# Patient Record
Sex: FEMALE | Race: WHITE | Employment: OTHER | URBAN - METROPOLITAN AREA
[De-identification: names, ages, dates, MRNs, and addresses within clinical notes are randomized per-mention and may not be internally consistent; named-entity substitution may affect disease eponyms.]

---

## 2017-01-30 RX ORDER — METOPROLOL TARTRATE 100 MG/1
TABLET ORAL
Qty: 30 TABLET | Refills: 0 | Status: SHIPPED | OUTPATIENT
Start: 2017-01-30 | End: 2017-03-02 | Stop reason: SDUPTHER

## 2017-03-03 RX ORDER — METOPROLOL TARTRATE 100 MG/1
TABLET ORAL
Qty: 30 TABLET | Refills: 0 | Status: SHIPPED | OUTPATIENT
Start: 2017-03-03 | End: 2017-04-02 | Stop reason: SDUPTHER

## 2017-04-03 RX ORDER — AMLODIPINE BESYLATE 10 MG/1
TABLET ORAL
Qty: 30 TABLET | Refills: 3 | Status: SHIPPED | OUTPATIENT
Start: 2017-04-03 | End: 2017-08-08 | Stop reason: SDUPTHER

## 2017-04-03 RX ORDER — METOPROLOL TARTRATE 100 MG/1
TABLET ORAL
Qty: 30 TABLET | Refills: 0 | Status: SHIPPED | OUTPATIENT
Start: 2017-04-03 | End: 2017-05-05 | Stop reason: SDUPTHER

## 2017-04-04 ENCOUNTER — OFFICE VISIT (OUTPATIENT)
Dept: FAMILY MEDICINE CLINIC | Age: 82
End: 2017-04-04

## 2017-04-04 VITALS
OXYGEN SATURATION: 98 % | HEART RATE: 63 BPM | BODY MASS INDEX: 30.65 KG/M2 | DIASTOLIC BLOOD PRESSURE: 66 MMHG | WEIGHT: 173 LBS | HEIGHT: 63 IN | SYSTOLIC BLOOD PRESSURE: 132 MMHG

## 2017-04-04 DIAGNOSIS — L30.9 DERMATITIS: Primary | ICD-10-CM

## 2017-04-04 PROCEDURE — 99213 OFFICE O/P EST LOW 20 MIN: CPT | Performed by: NURSE PRACTITIONER

## 2017-04-04 ASSESSMENT — ENCOUNTER SYMPTOMS
DIARRHEA: 0
VOMITING: 0
SHORTNESS OF BREATH: 0
EYE PAIN: 0
COUGH: 0
NAIL CHANGES: 0
SORE THROAT: 0
RHINORRHEA: 0

## 2017-04-18 RX ORDER — ALENDRONATE SODIUM 70 MG/1
TABLET ORAL
Qty: 4 TABLET | Refills: 2 | Status: SHIPPED | OUTPATIENT
Start: 2017-04-18 | End: 2017-07-20 | Stop reason: SDUPTHER

## 2017-05-08 RX ORDER — METOPROLOL TARTRATE 100 MG/1
TABLET ORAL
Qty: 30 TABLET | Refills: 0 | Status: SHIPPED | OUTPATIENT
Start: 2017-05-08 | End: 2017-06-07 | Stop reason: SDUPTHER

## 2017-06-08 RX ORDER — METOPROLOL TARTRATE 100 MG/1
TABLET ORAL
Qty: 30 TABLET | Refills: 0 | Status: SHIPPED | OUTPATIENT
Start: 2017-06-08 | End: 2017-07-07 | Stop reason: SDUPTHER

## 2017-07-10 RX ORDER — METOPROLOL TARTRATE 100 MG/1
TABLET ORAL
Qty: 30 TABLET | Refills: 0 | Status: SHIPPED | OUTPATIENT
Start: 2017-07-10 | End: 2017-08-08 | Stop reason: SDUPTHER

## 2017-07-21 RX ORDER — ALENDRONATE SODIUM 70 MG/1
TABLET ORAL
Qty: 4 TABLET | Refills: 1 | Status: SHIPPED | OUTPATIENT
Start: 2017-07-21 | End: 2017-11-21

## 2017-08-09 RX ORDER — METOPROLOL TARTRATE 100 MG/1
TABLET ORAL
Qty: 30 TABLET | Refills: 0 | Status: SHIPPED | OUTPATIENT
Start: 2017-08-09 | End: 2017-09-10 | Stop reason: SDUPTHER

## 2017-08-09 RX ORDER — AMLODIPINE BESYLATE 10 MG/1
TABLET ORAL
Qty: 30 TABLET | Refills: 2 | Status: SHIPPED | OUTPATIENT
Start: 2017-08-09 | End: 2017-11-16 | Stop reason: SDUPTHER

## 2017-09-11 RX ORDER — METOPROLOL TARTRATE 100 MG/1
TABLET ORAL
Qty: 30 TABLET | Refills: 0 | Status: SHIPPED | OUTPATIENT
Start: 2017-09-11 | End: 2017-10-12 | Stop reason: SDUPTHER

## 2017-09-14 ENCOUNTER — HOSPITAL ENCOUNTER (OUTPATIENT)
Dept: MAMMOGRAPHY | Age: 82
Discharge: HOME | End: 2017-09-14
Admitting: FAMILY MEDICINE

## 2017-09-14 DIAGNOSIS — Z12.31 ENCOUNTER FOR SCREENING MAMMOGRAM FOR BREAST CANCER: ICD-10-CM

## 2017-10-13 RX ORDER — METOPROLOL TARTRATE 100 MG/1
TABLET ORAL
Qty: 30 TABLET | Refills: 0 | Status: SHIPPED | OUTPATIENT
Start: 2017-10-13 | End: 2017-11-16 | Stop reason: SDUPTHER

## 2017-10-13 NOTE — TELEPHONE ENCOUNTER
Future Appointments  Date Time Provider Alice Randolph   10/19/2017 11:00 AM Tamara Cruz CNP [facility]   Last ov 4/4/17

## 2017-11-16 NOTE — TELEPHONE ENCOUNTER
Last appt - 04/04/17    Future Appointments  Date Time Provider Alice Randolph   11/21/2017 3:00 PM Lorin Expose, CNP [facility]

## 2017-11-17 RX ORDER — AMLODIPINE BESYLATE 10 MG/1
TABLET ORAL
Qty: 30 TABLET | Refills: 1 | Status: SHIPPED | OUTPATIENT
Start: 2017-11-17 | End: 2018-01-19 | Stop reason: SDUPTHER

## 2017-11-17 RX ORDER — METOPROLOL TARTRATE 100 MG/1
TABLET ORAL
Qty: 30 TABLET | Refills: 0 | Status: SHIPPED | OUTPATIENT
Start: 2017-11-17 | End: 2017-12-15 | Stop reason: SDUPTHER

## 2017-11-21 ENCOUNTER — OFFICE VISIT (OUTPATIENT)
Dept: FAMILY MEDICINE CLINIC | Age: 82
End: 2017-11-21

## 2017-11-21 VITALS
HEART RATE: 83 BPM | OXYGEN SATURATION: 97 % | WEIGHT: 167 LBS | SYSTOLIC BLOOD PRESSURE: 136 MMHG | DIASTOLIC BLOOD PRESSURE: 72 MMHG | BODY MASS INDEX: 29.59 KG/M2 | HEIGHT: 63 IN

## 2017-11-21 DIAGNOSIS — I10 ESSENTIAL HYPERTENSION: ICD-10-CM

## 2017-11-21 DIAGNOSIS — Z23 IMMUNIZATION DUE: ICD-10-CM

## 2017-11-21 DIAGNOSIS — B35.9 TINEA: ICD-10-CM

## 2017-11-21 DIAGNOSIS — R21 RASH: Primary | ICD-10-CM

## 2017-11-21 PROCEDURE — 90670 PCV13 VACCINE IM: CPT | Performed by: NURSE PRACTITIONER

## 2017-11-21 PROCEDURE — 99213 OFFICE O/P EST LOW 20 MIN: CPT | Performed by: NURSE PRACTITIONER

## 2017-11-21 PROCEDURE — G0009 ADMIN PNEUMOCOCCAL VACCINE: HCPCS | Performed by: NURSE PRACTITIONER

## 2017-11-21 RX ORDER — CLOTRIMAZOLE AND BETAMETHASONE DIPROPIONATE 10; .64 MG/G; MG/G
CREAM TOPICAL
Qty: 15 G | Refills: 0 | Status: SHIPPED | OUTPATIENT
Start: 2017-11-21 | End: 2021-06-16 | Stop reason: ALTCHOICE

## 2017-11-21 ASSESSMENT — ENCOUNTER SYMPTOMS
COUGH: 0
SORE THROAT: 0

## 2017-12-18 RX ORDER — METOPROLOL TARTRATE 100 MG/1
TABLET ORAL
Qty: 30 TABLET | Refills: 0 | Status: SHIPPED | OUTPATIENT
Start: 2017-12-18 | End: 2018-01-19 | Stop reason: SDUPTHER

## 2018-01-22 RX ORDER — AMLODIPINE BESYLATE 10 MG/1
TABLET ORAL
Qty: 30 TABLET | Refills: 0 | Status: SHIPPED | OUTPATIENT
Start: 2018-01-22 | End: 2018-02-18 | Stop reason: SDUPTHER

## 2018-01-22 RX ORDER — METOPROLOL TARTRATE 100 MG/1
TABLET ORAL
Qty: 30 TABLET | Refills: 0 | Status: SHIPPED | OUTPATIENT
Start: 2018-01-22 | End: 2018-02-18 | Stop reason: SDUPTHER

## 2018-02-19 RX ORDER — METOPROLOL TARTRATE 100 MG/1
TABLET ORAL
Qty: 30 TABLET | Refills: 0 | Status: SHIPPED | OUTPATIENT
Start: 2018-02-19 | End: 2018-03-22 | Stop reason: SDUPTHER

## 2018-02-19 RX ORDER — AMLODIPINE BESYLATE 10 MG/1
TABLET ORAL
Qty: 30 TABLET | Refills: 0 | Status: SHIPPED | OUTPATIENT
Start: 2018-02-19 | End: 2018-03-22 | Stop reason: SDUPTHER

## 2018-03-23 RX ORDER — AMLODIPINE BESYLATE 10 MG/1
TABLET ORAL
Qty: 30 TABLET | Refills: 0 | Status: SHIPPED | OUTPATIENT
Start: 2018-03-23 | End: 2018-04-22 | Stop reason: SDUPTHER

## 2018-03-23 RX ORDER — METOPROLOL TARTRATE 100 MG/1
TABLET ORAL
Qty: 30 TABLET | Refills: 0 | Status: SHIPPED | OUTPATIENT
Start: 2018-03-23 | End: 2018-04-22 | Stop reason: SDUPTHER

## 2018-04-23 RX ORDER — AMLODIPINE BESYLATE 10 MG/1
TABLET ORAL
Qty: 30 TABLET | Refills: 2 | Status: SHIPPED | OUTPATIENT
Start: 2018-04-23 | End: 2018-07-24 | Stop reason: SDUPTHER

## 2018-04-23 RX ORDER — METOPROLOL TARTRATE 100 MG/1
TABLET ORAL
Qty: 30 TABLET | Refills: 2 | Status: SHIPPED | OUTPATIENT
Start: 2018-04-23 | End: 2018-07-24 | Stop reason: SDUPTHER

## 2018-07-25 RX ORDER — METOPROLOL TARTRATE 100 MG/1
TABLET ORAL
Qty: 30 TABLET | Refills: 1 | Status: SHIPPED | OUTPATIENT
Start: 2018-07-25 | End: 2018-09-26 | Stop reason: SDUPTHER

## 2018-07-25 RX ORDER — AMLODIPINE BESYLATE 10 MG/1
TABLET ORAL
Qty: 30 TABLET | Refills: 1 | Status: SHIPPED | OUTPATIENT
Start: 2018-07-25 | End: 2018-09-26 | Stop reason: SDUPTHER

## 2018-08-01 ENCOUNTER — OFFICE VISIT (OUTPATIENT)
Dept: FAMILY MEDICINE CLINIC | Age: 83
End: 2018-08-01

## 2018-08-01 VITALS
BODY MASS INDEX: 28.88 KG/M2 | SYSTOLIC BLOOD PRESSURE: 146 MMHG | OXYGEN SATURATION: 98 % | HEART RATE: 72 BPM | HEIGHT: 63 IN | DIASTOLIC BLOOD PRESSURE: 72 MMHG | WEIGHT: 163 LBS

## 2018-08-01 DIAGNOSIS — R06.01 ORTHOPNEA: ICD-10-CM

## 2018-08-01 DIAGNOSIS — M79.89 LEG SWELLING: Primary | ICD-10-CM

## 2018-08-01 DIAGNOSIS — M79.89 LEG SWELLING: ICD-10-CM

## 2018-08-01 DIAGNOSIS — D17.20 LIPOMA OF LOWER EXTREMITY, UNSPECIFIED LATERALITY: ICD-10-CM

## 2018-08-01 LAB
A/G RATIO: 1.3 (ref 1.1–2.2)
ALBUMIN SERPL-MCNC: 4 G/DL (ref 3.4–5)
ALP BLD-CCNC: 90 U/L (ref 40–129)
ALT SERPL-CCNC: 10 U/L (ref 10–40)
ANION GAP SERPL CALCULATED.3IONS-SCNC: 11 MMOL/L (ref 3–16)
AST SERPL-CCNC: 15 U/L (ref 15–37)
BILIRUB SERPL-MCNC: 0.6 MG/DL (ref 0–1)
BUN BLDV-MCNC: 23 MG/DL (ref 7–20)
CALCIUM SERPL-MCNC: 9.5 MG/DL (ref 8.3–10.6)
CHLORIDE BLD-SCNC: 105 MMOL/L (ref 99–110)
CO2: 26 MMOL/L (ref 21–32)
CREAT SERPL-MCNC: 1.1 MG/DL (ref 0.6–1.2)
GFR AFRICAN AMERICAN: 57
GFR NON-AFRICAN AMERICAN: 48
GLOBULIN: 3.1 G/DL
GLUCOSE BLD-MCNC: 83 MG/DL (ref 70–99)
HCT VFR BLD CALC: 30.7 % (ref 36–48)
HEMOGLOBIN: 10.3 G/DL (ref 12–16)
MCH RBC QN AUTO: 33.4 PG (ref 26–34)
MCHC RBC AUTO-ENTMCNC: 33.6 G/DL (ref 31–36)
MCV RBC AUTO: 99.4 FL (ref 80–100)
PDW BLD-RTO: 15.5 % (ref 12.4–15.4)
PLATELET # BLD: 220 K/UL (ref 135–450)
PMV BLD AUTO: 9 FL (ref 5–10.5)
POTASSIUM SERPL-SCNC: 4.4 MMOL/L (ref 3.5–5.1)
PRO-BNP: 384 PG/ML (ref 0–449)
RBC # BLD: 3.09 M/UL (ref 4–5.2)
SODIUM BLD-SCNC: 142 MMOL/L (ref 136–145)
TOTAL PROTEIN: 7.1 G/DL (ref 6.4–8.2)
WBC # BLD: 4.6 K/UL (ref 4–11)

## 2018-08-01 PROCEDURE — 99213 OFFICE O/P EST LOW 20 MIN: CPT | Performed by: NURSE PRACTITIONER

## 2018-08-01 ASSESSMENT — PATIENT HEALTH QUESTIONNAIRE - PHQ9
SUM OF ALL RESPONSES TO PHQ9 QUESTIONS 1 & 2: 0
2. FEELING DOWN, DEPRESSED OR HOPELESS: 0
1. LITTLE INTEREST OR PLEASURE IN DOING THINGS: 0
SUM OF ALL RESPONSES TO PHQ QUESTIONS 1-9: 0

## 2018-08-01 ASSESSMENT — ENCOUNTER SYMPTOMS
STRIDOR: 0
SHORTNESS OF BREATH: 0
WHEEZING: 0
COUGH: 0
COLOR CHANGE: 0

## 2018-08-02 DIAGNOSIS — M79.89 LEG SWELLING: Primary | ICD-10-CM

## 2018-08-02 RX ORDER — HYDROCHLOROTHIAZIDE 25 MG/1
25 TABLET ORAL DAILY
Qty: 7 TABLET | Refills: 0 | Status: SHIPPED | OUTPATIENT
Start: 2018-08-02 | End: 2018-08-15 | Stop reason: SDUPTHER

## 2018-08-08 ENCOUNTER — TELEPHONE (OUTPATIENT)
Dept: FAMILY MEDICINE CLINIC | Age: 83
End: 2018-08-08

## 2018-08-15 NOTE — TELEPHONE ENCOUNTER
Patients daughter called back because she hadnt heard from our office. She wants to know if the patient is suppose to stay on the hctz and if you were going to change her BP medications.

## 2018-08-16 RX ORDER — HYDROCHLOROTHIAZIDE 25 MG/1
25 TABLET ORAL DAILY
Qty: 30 TABLET | Refills: 0 | Status: SHIPPED | OUTPATIENT
Start: 2018-08-16 | End: 2018-09-26 | Stop reason: SDUPTHER

## 2018-09-05 ENCOUNTER — OFFICE VISIT (OUTPATIENT)
Dept: FAMILY MEDICINE CLINIC | Age: 83
End: 2018-09-05

## 2018-09-05 VITALS
BODY MASS INDEX: 28.53 KG/M2 | HEART RATE: 63 BPM | DIASTOLIC BLOOD PRESSURE: 62 MMHG | SYSTOLIC BLOOD PRESSURE: 138 MMHG | OXYGEN SATURATION: 97 % | HEIGHT: 63 IN | WEIGHT: 161 LBS

## 2018-09-05 DIAGNOSIS — I10 ESSENTIAL HYPERTENSION: ICD-10-CM

## 2018-09-05 DIAGNOSIS — M79.89 LEG SWELLING: Primary | ICD-10-CM

## 2018-09-05 DIAGNOSIS — M79.89 LEG SWELLING: ICD-10-CM

## 2018-09-05 DIAGNOSIS — Z23 IMMUNIZATION DUE: ICD-10-CM

## 2018-09-05 LAB
ANION GAP SERPL CALCULATED.3IONS-SCNC: 12 MMOL/L (ref 3–16)
BUN BLDV-MCNC: 25 MG/DL (ref 7–20)
CALCIUM SERPL-MCNC: 9.4 MG/DL (ref 8.3–10.6)
CHLORIDE BLD-SCNC: 100 MMOL/L (ref 99–110)
CO2: 28 MMOL/L (ref 21–32)
CREAT SERPL-MCNC: 1.1 MG/DL (ref 0.6–1.2)
GFR AFRICAN AMERICAN: 57
GFR NON-AFRICAN AMERICAN: 47
GLUCOSE BLD-MCNC: 81 MG/DL (ref 70–99)
POTASSIUM SERPL-SCNC: 4.3 MMOL/L (ref 3.5–5.1)
SODIUM BLD-SCNC: 140 MMOL/L (ref 136–145)

## 2018-09-05 PROCEDURE — G0008 ADMIN INFLUENZA VIRUS VAC: HCPCS | Performed by: NURSE PRACTITIONER

## 2018-09-05 PROCEDURE — 90662 IIV NO PRSV INCREASED AG IM: CPT | Performed by: NURSE PRACTITIONER

## 2018-09-05 PROCEDURE — 99213 OFFICE O/P EST LOW 20 MIN: CPT | Performed by: NURSE PRACTITIONER

## 2018-09-05 ASSESSMENT — ENCOUNTER SYMPTOMS
STRIDOR: 0
WHEEZING: 0
COUGH: 0
COLOR CHANGE: 0
SHORTNESS OF BREATH: 0

## 2018-09-05 NOTE — PROGRESS NOTES
HPI: Lisa Brown is a 81 y.o. female who presents for hypertension and leg swelling. Leg swelling has improved with starting HCTZ daily. She is able to get her feet into her shoes. Her blood pressure is well controlled today. She tells me she has been checking her BP at home daily and those readings have been \"good. \" she forgot to bring her BP log with her today. States she has had some mild dizziness some mornings after taking HCTZ. Dr. Ye Fernandez for anemia. 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 No pertinent family history. Review of Systems   Constitutional: Negative for diaphoresis, fatigue, fever and unexpected weight change. Respiratory: Negative for cough, shortness of breath, wheezing and stridor. Cardiovascular: Positive for leg swelling. Negative for chest pain and palpitations. Skin: Negative for color change, pallor, rash and wound. Neurological: Negative for dizziness and headaches. Physical Exam   Constitutional: She is oriented to person, place, and time. Vital signs are normal. She appears well-developed and well-nourished. No distress. HENT:   Head: Normocephalic and atraumatic. Right Ear: Tympanic membrane and ear canal normal.   Left Ear: Tympanic membrane and ear canal normal.   Mouth/Throat: Uvula is midline and mucous membranes are normal.   Neck: Full passive range of motion without pain. Cardiovascular: Normal rate, regular rhythm, S1 normal, S2 normal, normal heart sounds and intact distal pulses. Exam reveals no gallop and no friction rub. No murmur heard. No edema noted in the lower extremities.  The bilateral ankles are

## 2018-09-26 RX ORDER — HYDROCHLOROTHIAZIDE 25 MG/1
TABLET ORAL
Qty: 30 TABLET | Refills: 5 | Status: SHIPPED | OUTPATIENT
Start: 2018-09-26 | End: 2019-06-02 | Stop reason: SDUPTHER

## 2018-09-26 RX ORDER — METOPROLOL TARTRATE 100 MG/1
TABLET ORAL
Qty: 30 TABLET | Refills: 5 | Status: SHIPPED | OUTPATIENT
Start: 2018-09-26 | End: 2019-04-03 | Stop reason: SDUPTHER

## 2018-09-26 RX ORDER — AMLODIPINE BESYLATE 10 MG/1
TABLET ORAL
Qty: 30 TABLET | Refills: 5 | Status: SHIPPED | OUTPATIENT
Start: 2018-09-26 | End: 2019-04-03 | Stop reason: SDUPTHER

## 2018-09-26 NOTE — TELEPHONE ENCOUNTER
Last appt - 9/5/2018    Future Appointments  Date Time Provider Alice Randolph   10/3/2018 1:30 PM [facility] RM [id number] [facility]

## 2018-10-03 ENCOUNTER — HOSPITAL ENCOUNTER (OUTPATIENT)
Dept: MAMMOGRAPHY | Age: 83
Discharge: HOME | End: 2018-10-03
Payer: MEDICARE

## 2018-10-03 DIAGNOSIS — Z12.39 BREAST CANCER SCREENING: ICD-10-CM

## 2018-10-03 PROCEDURE — 77063 BREAST TOMOSYNTHESIS BI: CPT

## 2019-04-04 RX ORDER — AMLODIPINE BESYLATE 10 MG/1
TABLET ORAL
Qty: 30 TABLET | Refills: 4 | Status: SHIPPED | OUTPATIENT
Start: 2019-04-04 | End: 2019-09-02 | Stop reason: SDUPTHER

## 2019-04-04 RX ORDER — METOPROLOL TARTRATE 100 MG/1
TABLET ORAL
Qty: 30 TABLET | Refills: 4 | Status: SHIPPED | OUTPATIENT
Start: 2019-04-04 | End: 2019-09-02 | Stop reason: SDUPTHER

## 2019-06-03 RX ORDER — HYDROCHLOROTHIAZIDE 25 MG/1
TABLET ORAL
Qty: 30 TABLET | Refills: 4 | Status: SHIPPED | OUTPATIENT
Start: 2019-06-03 | End: 2019-11-03 | Stop reason: SDUPTHER

## 2019-08-06 ENCOUNTER — TELEPHONE (OUTPATIENT)
Dept: FAMILY MEDICINE CLINIC | Age: 84
End: 2019-08-06

## 2019-08-06 ENCOUNTER — OFFICE VISIT (OUTPATIENT)
Dept: FAMILY MEDICINE CLINIC | Age: 84
End: 2019-08-06
Payer: MEDICARE

## 2019-08-06 VITALS
OXYGEN SATURATION: 96 % | HEIGHT: 63 IN | WEIGHT: 153 LBS | DIASTOLIC BLOOD PRESSURE: 62 MMHG | HEART RATE: 70 BPM | SYSTOLIC BLOOD PRESSURE: 122 MMHG | BODY MASS INDEX: 27.11 KG/M2

## 2019-08-06 DIAGNOSIS — D51.9 ANEMIA DUE TO VITAMIN B12 DEFICIENCY, UNSPECIFIED B12 DEFICIENCY TYPE: ICD-10-CM

## 2019-08-06 DIAGNOSIS — I10 ESSENTIAL HYPERTENSION: ICD-10-CM

## 2019-08-06 DIAGNOSIS — Z09 HOSPITAL DISCHARGE FOLLOW-UP: ICD-10-CM

## 2019-08-06 DIAGNOSIS — E87.6 HYPOKALEMIA: ICD-10-CM

## 2019-08-06 DIAGNOSIS — R05.9 COUGH: ICD-10-CM

## 2019-08-06 DIAGNOSIS — R07.2 PRECORDIAL CHEST PAIN: Primary | ICD-10-CM

## 2019-08-06 LAB
ANION GAP SERPL CALCULATED.3IONS-SCNC: 14 MMOL/L (ref 3–16)
BUN BLDV-MCNC: 21 MG/DL (ref 7–20)
CALCIUM SERPL-MCNC: 9.7 MG/DL (ref 8.3–10.6)
CHLORIDE BLD-SCNC: 102 MMOL/L (ref 99–110)
CO2: 26 MMOL/L (ref 21–32)
CREAT SERPL-MCNC: 1.2 MG/DL (ref 0.6–1.2)
GFR AFRICAN AMERICAN: 52
GFR NON-AFRICAN AMERICAN: 43
GLUCOSE BLD-MCNC: 97 MG/DL (ref 70–99)
HCT VFR BLD CALC: 28.4 % (ref 36–48)
HEMOGLOBIN: 9.6 G/DL (ref 12–16)
MCH RBC QN AUTO: 33.7 PG (ref 26–34)
MCHC RBC AUTO-ENTMCNC: 33.6 G/DL (ref 31–36)
MCV RBC AUTO: 100.1 FL (ref 80–100)
PDW BLD-RTO: 15.4 % (ref 12.4–15.4)
PLATELET # BLD: 238 K/UL (ref 135–450)
PMV BLD AUTO: 9.1 FL (ref 5–10.5)
POTASSIUM SERPL-SCNC: 4.3 MMOL/L (ref 3.5–5.1)
RBC # BLD: 2.84 M/UL (ref 4–5.2)
SODIUM BLD-SCNC: 142 MMOL/L (ref 136–145)
WBC # BLD: 5.3 K/UL (ref 4–11)

## 2019-08-06 PROCEDURE — 99213 OFFICE O/P EST LOW 20 MIN: CPT | Performed by: NURSE PRACTITIONER

## 2019-08-06 PROCEDURE — 1111F DSCHRG MED/CURRENT MED MERGE: CPT | Performed by: NURSE PRACTITIONER

## 2019-08-06 ASSESSMENT — ENCOUNTER SYMPTOMS
SHORTNESS OF BREATH: 0
COUGH: 1
VOMITING: 0
NAUSEA: 0
SORE THROAT: 0
SINUS PRESSURE: 1
DIARRHEA: 0
COLOR CHANGE: 0
TROUBLE SWALLOWING: 0
CHEST TIGHTNESS: 0
CONSTIPATION: 0
WHEEZING: 0
STRIDOR: 0

## 2019-08-06 ASSESSMENT — PATIENT HEALTH QUESTIONNAIRE - PHQ9
2. FEELING DOWN, DEPRESSED OR HOPELESS: 0
SUM OF ALL RESPONSES TO PHQ QUESTIONS 1-9: 0
SUM OF ALL RESPONSES TO PHQ QUESTIONS 1-9: 0
1. LITTLE INTEREST OR PLEASURE IN DOING THINGS: 0
SUM OF ALL RESPONSES TO PHQ9 QUESTIONS 1 & 2: 0

## 2019-08-06 NOTE — PROGRESS NOTES
weeks starting May 16th, then once a month thereafter 6 kit 5    aspirin 81 MG tablet Take 81 mg by mouth daily.  vitamin D (CHOLECALCIFEROL) 1000 UNIT TABS tablet Take 1,000 Units by mouth daily. Medications patient taking as of now reconciled against medications ordered at time of hospital discharge: Yes    Chief Complaint   Patient presents with    Follow-Up from Hospital     shoulder pain and dizziness       Patient presents following hospital admission from 7/26/2019 to 7/27/2019 at [facility]. She was admitted d/t precordial chest pain with deferred L shoulder pain. Patient had Betty Han Stress test completed during admission which was negative. Chest Xray unremarkable with no significant findings. She states that chest, shoulder and neck pain have subsided and resolved since hospital discharge. She is here today with complaints of ongoing nasal congestion and post nasal drip. Cough present; clear non-expectorant secretions. Patient denies headache. She denies fever, chills, nausea or vomiting. Hypokalemia: K 3.4 during hospital stay. Possible due to fluid administration. Patient denies chest pain, palpitations or shortness of breath at this time. Chronic Anemia: she is on monthly B12 injections. B12 deficiency-- [facility] following annually. Last visit was October 2018. Baseline hbg 10-11 per hematology but most recent 9.4 at hospital visit. Will recheck and if remains lower then baseline refer back to specialist.       Hypertension: well controlled, current treatment HCTZ, amlodipine, metoprolol      Inpatient course: Discharge summary reviewed- see chart. Vitals:    08/06/19 1326   BP: 122/62   Site: Left Upper Arm   Position: Sitting   Pulse: 70   SpO2: 96%   Weight: 153 lb (69.4 kg)   Height: 5' 3\" (1.6 m)     Body mass index is 27.1 kg/m².    Wt Readings from Last 3 Encounters:   08/06/19 153 lb (69.4 kg)   09/05/18 161 lb (73 kg)   08/01/18 163 lb (73.9 kg)     BP exhibits normal range of motion. Achilles tendon exhibits no pain. Left ankle: She exhibits swelling. She exhibits normal range of motion. Achilles tendon exhibits no pain. Lymphadenopathy:     She has no cervical adenopathy. Neurological: She is alert and oriented to person, place, and time. No cranial nerve deficit or sensory deficit. Skin: Skin is warm, dry and intact. She is not diaphoretic. Nails show no clubbing. Psychiatric: She has a normal mood and affect. Her speech is normal and behavior is normal. Judgment and thought content normal. Cognition and memory are normal.   Nursing note and vitals reviewed. Assessment/Plan:  1. Precordial chest pain- resolved  - DC DISCHARGE MEDS RECONCILED W/ CURRENT OUTPATIENT MED LIST    2. Cough  - XR CHEST STANDARD (2 VW); Future    3. Essential hypertension- controlled, continue current regimen  - BASIC METABOLIC PANEL; Future  -Continue current medication regimen  -Monitor blood pressure readings at home.  -Follow low salt intake diet     4. Anemia due to vitamin B12 deficiency, unspecified B12 deficiency type  - Last Hgb slightly lower than baseline, recheck CBC and if persistently low refer back to specialist  - CBC; Future    5. Hypokalemia  -BASIC METABOLIC PANEL; Future  -Consume foods high in Potassium (e.g. Green leafy vegetables, fresh fruits/veggies)    6.  Hospital discharge follow-up  - DC DISCHARGE MEDS RECONCILED W/ CURRENT OUTPATIENT MED LIST    -Schedule Annual Wellness examination with Dr. Souleymane Arteaga  -Lab work today in [address] [place] [address] foods high in Potassium (e.g. Green leafy vegetables, fresh fruits/veggies)  -Monitor blood pressure readings at home.  -Follow low salt intake diet   -F/u in 1 month for AWV      Medical Decision Making: moderate complexity

## 2019-09-03 RX ORDER — METOPROLOL TARTRATE 100 MG/1
TABLET ORAL
Qty: 30 TABLET | Refills: 3 | Status: SHIPPED | OUTPATIENT
Start: 2019-09-03 | End: 2019-12-04 | Stop reason: SDUPTHER

## 2019-09-03 RX ORDER — AMLODIPINE BESYLATE 10 MG/1
TABLET ORAL
Qty: 30 TABLET | Refills: 3 | Status: SHIPPED | OUTPATIENT
Start: 2019-09-03 | End: 2019-12-04 | Stop reason: SDUPTHER

## 2019-09-05 PROBLEM — Z09 HOSPITAL DISCHARGE FOLLOW-UP: Status: RESOLVED | Noted: 2019-08-06 | Resolved: 2019-09-05

## 2019-09-05 PROBLEM — R05.9 COUGH: Status: RESOLVED | Noted: 2019-08-06 | Resolved: 2019-09-05

## 2019-09-12 ENCOUNTER — OFFICE VISIT (OUTPATIENT)
Dept: FAMILY MEDICINE CLINIC | Age: 84
End: 2019-09-12
Payer: MEDICARE

## 2019-09-12 ENCOUNTER — HOSPITAL ENCOUNTER (OUTPATIENT)
Dept: GENERAL RADIOLOGY | Age: 84
Discharge: HOME | End: 2019-09-12
Payer: MEDICARE

## 2019-09-12 VITALS
BODY MASS INDEX: 26.51 KG/M2 | HEIGHT: 63 IN | DIASTOLIC BLOOD PRESSURE: 60 MMHG | OXYGEN SATURATION: 99 % | WEIGHT: 149.6 LBS | SYSTOLIC BLOOD PRESSURE: 124 MMHG | HEART RATE: 69 BPM

## 2019-09-12 DIAGNOSIS — Z00.00 MEDICARE ANNUAL WELLNESS VISIT, SUBSEQUENT: ICD-10-CM

## 2019-09-12 DIAGNOSIS — Z00.00 MEDICARE ANNUAL WELLNESS VISIT, SUBSEQUENT: Primary | ICD-10-CM

## 2019-09-12 DIAGNOSIS — J45.21 MILD INTERMITTENT REACTIVE AIRWAY DISEASE WITH ACUTE EXACERBATION: ICD-10-CM

## 2019-09-12 PROBLEM — R06.2 WHEEZING: Status: ACTIVE | Noted: 2019-09-12

## 2019-09-12 PROCEDURE — G0439 PPPS, SUBSEQ VISIT: HCPCS | Performed by: FAMILY MEDICINE

## 2019-09-12 PROCEDURE — 71046 X-RAY EXAM CHEST 2 VIEWS: CPT

## 2019-09-12 RX ORDER — ALBUTEROL SULFATE 90 UG/1
2 AEROSOL, METERED RESPIRATORY (INHALATION) EVERY 6 HOURS PRN
Qty: 1 INHALER | Refills: 3 | Status: SHIPPED | OUTPATIENT
Start: 2019-09-12 | End: 2020-11-02

## 2019-09-12 ASSESSMENT — PATIENT HEALTH QUESTIONNAIRE - PHQ9
SUM OF ALL RESPONSES TO PHQ QUESTIONS 1-9: 0
SUM OF ALL RESPONSES TO PHQ QUESTIONS 1-9: 0

## 2019-09-12 ASSESSMENT — ENCOUNTER SYMPTOMS
CHOKING: 0
STRIDOR: 0
CHEST TIGHTNESS: 0
WHEEZING: 1
BACK PAIN: 0
ABDOMINAL PAIN: 0
SHORTNESS OF BREATH: 0
COUGH: 0

## 2019-09-12 ASSESSMENT — LIFESTYLE VARIABLES: HOW OFTEN DO YOU HAVE A DRINK CONTAINING ALCOHOL: 0

## 2019-10-22 ENCOUNTER — HOSPITAL ENCOUNTER (OUTPATIENT)
Dept: MAMMOGRAPHY | Age: 84
Discharge: HOME | End: 2019-10-22
Payer: MEDICARE

## 2019-10-22 DIAGNOSIS — Z12.31 BREAST CANCER SCREENING BY MAMMOGRAM: ICD-10-CM

## 2019-10-22 PROCEDURE — 77067 SCR MAMMO BI INCL CAD: CPT

## 2019-11-04 RX ORDER — HYDROCHLOROTHIAZIDE 25 MG/1
TABLET ORAL
Qty: 30 TABLET | Refills: 3 | Status: SHIPPED | OUTPATIENT
Start: 2019-11-04 | End: 2020-01-14 | Stop reason: ALTCHOICE

## 2019-12-05 RX ORDER — METOPROLOL TARTRATE 100 MG/1
TABLET ORAL
Qty: 90 TABLET | Refills: 2 | Status: SHIPPED | OUTPATIENT
Start: 2019-12-05 | End: 2020-09-10

## 2019-12-05 RX ORDER — AMLODIPINE BESYLATE 10 MG/1
TABLET ORAL
Qty: 90 TABLET | Refills: 2 | Status: SHIPPED | OUTPATIENT
Start: 2019-12-05 | End: 2021-06-16 | Stop reason: ALTCHOICE

## 2020-01-14 ENCOUNTER — TELEPHONE (OUTPATIENT)
Dept: FAMILY MEDICINE CLINIC | Age: 85
End: 2020-01-14

## 2020-01-14 ENCOUNTER — OFFICE VISIT (OUTPATIENT)
Dept: FAMILY MEDICINE CLINIC | Age: 85
End: 2020-01-14
Payer: MEDICARE

## 2020-01-14 VITALS
WEIGHT: 141.6 LBS | OXYGEN SATURATION: 98 % | BODY MASS INDEX: 25.09 KG/M2 | DIASTOLIC BLOOD PRESSURE: 58 MMHG | HEART RATE: 63 BPM | HEIGHT: 63 IN | SYSTOLIC BLOOD PRESSURE: 110 MMHG

## 2020-01-14 PROBLEM — D50.9 IRON DEFICIENCY ANEMIA: Status: ACTIVE | Noted: 2020-01-14

## 2020-01-14 PROBLEM — Z90.49 STATUS POST CHOLECYSTECTOMY: Status: ACTIVE | Noted: 2020-01-14

## 2020-01-14 PROBLEM — J45.20 MILD INTERMITTENT ASTHMA: Status: ACTIVE | Noted: 2020-01-14

## 2020-01-14 PROCEDURE — 99214 OFFICE O/P EST MOD 30 MIN: CPT | Performed by: FAMILY MEDICINE

## 2020-01-14 ASSESSMENT — ENCOUNTER SYMPTOMS
CHEST TIGHTNESS: 0
BACK PAIN: 0
STRIDOR: 0
ABDOMINAL PAIN: 0
COUGH: 0
SHORTNESS OF BREATH: 0
CHOKING: 0
WHEEZING: 0

## 2020-01-14 ASSESSMENT — PATIENT HEALTH QUESTIONNAIRE - PHQ9
SUM OF ALL RESPONSES TO PHQ QUESTIONS 1-9: 0
2. FEELING DOWN, DEPRESSED OR HOPELESS: 0
1. LITTLE INTEREST OR PLEASURE IN DOING THINGS: 0
SUM OF ALL RESPONSES TO PHQ QUESTIONS 1-9: 0
SUM OF ALL RESPONSES TO PHQ9 QUESTIONS 1 & 2: 0

## 2020-01-14 NOTE — TELEPHONE ENCOUNTER
Please tell Ac Lela that I would like her to reduce the dose of her amlodipine to 5 mg a day. She can achieve that dose by cutting her 10 mg tablets in 2. After she has been taking 5 mg a day for 2 weeks I would like her to come in for a nurse visit/blood pressure check. Tell her I think her blood pressure is just a little too low.

## 2020-01-14 NOTE — PROGRESS NOTES
Subjective:      Patient ID: Nalini Jin is a 81 y.o. female. IGOR Mckeon is here for follow-up after her recent gallbladder surgery. She did very well and feels very well at the moment. There is no gastrointestinal complaints, good appetite, normal bowel movements. Her blood pressure is well controlled. If her blood pressure remains as low as it is today at next visit I will reduce the dose of her antihypertensive medication. Her asthma has been quiet and still responds to inhaled albuterol. Her anemia persists and she is following along with Dr. Mata Bills, hematologist.    Review of Systems   Constitutional: Negative for activity change, appetite change, chills, diaphoresis, fatigue, fever and unexpected weight change. Respiratory: Negative for cough, choking, chest tightness, shortness of breath, wheezing and stridor. Cardiovascular: Negative for chest pain, palpitations and leg swelling. Gastrointestinal: Negative for abdominal pain. Genitourinary: Negative for difficulty urinating. Musculoskeletal: Negative for arthralgias and back pain. Skin: Negative for rash. Neurological: Negative for dizziness. Objective:   Physical Exam  Vitals signs and nursing note reviewed. Constitutional:       Appearance: She is well-developed. HENT:      Head: Normocephalic and atraumatic. Right Ear: External ear normal.      Left Ear: External ear normal.      Nose: Nose normal.   Eyes:      Conjunctiva/sclera: Conjunctivae normal.      Pupils: Pupils are equal, round, and reactive to light. Neck:      Musculoskeletal: Normal range of motion and neck supple. Thyroid: No thyromegaly. Vascular: No JVD. Trachea: No tracheal deviation. Cardiovascular:      Rate and Rhythm: Normal rate and regular rhythm. Heart sounds: Normal heart sounds. No murmur. No friction rub. No gallop. Pulmonary:      Effort: Pulmonary effort is normal. No respiratory distress.       Breath sounds: Normal breath sounds. No stridor. No wheezing or rales. Chest:      Chest wall: No tenderness. Abdominal:      General: Bowel sounds are normal. There is no distension. Palpations: Abdomen is soft. There is no mass. Tenderness: There is no tenderness. There is no guarding or rebound. Musculoskeletal: Normal range of motion. General: No tenderness. Lymphadenopathy:      Cervical: No cervical adenopathy. Skin:     General: Skin is warm and dry. Coloration: Skin is not pale. Findings: No erythema or rash. Neurological:      Mental Status: She is alert and oriented to person, place, and time. Cranial Nerves: No cranial nerve deficit. Motor: No abnormal muscle tone. Coordination: Coordination normal.      Deep Tendon Reflexes: Reflexes are normal and symmetric. Reflexes normal.         Assessment and plan      1. Status post cholecystectomy-doing well    2. Essential hypertension-well-controlled, consider reducing dose of antihypertensive meds at next visit      3. Mild intermittent asthma, unspecified whether complicated-continue albuterol as needed      4.  Iron deficiency anemia, unspecified iron deficiency anemia type-continue with Dr. Kadi Mora, hematologist          [address] [place] [address], DO

## 2020-01-28 ENCOUNTER — TELEPHONE (OUTPATIENT)
Dept: FAMILY MEDICINE CLINIC | Age: 85
End: 2020-01-28

## 2020-01-28 ENCOUNTER — NURSE ONLY (OUTPATIENT)
Dept: FAMILY MEDICINE CLINIC | Age: 85
End: 2020-01-28

## 2020-01-28 VITALS — DIASTOLIC BLOOD PRESSURE: 68 MMHG | SYSTOLIC BLOOD PRESSURE: 132 MMHG

## 2020-01-28 NOTE — TELEPHONE ENCOUNTER
Noted.  Thank you. Please let Cindy Kate know that her blood pressure today was excellent. Tell her I appreciate the fact that she came in for the recheck.

## 2020-02-17 ENCOUNTER — CARE COORDINATION (OUTPATIENT)
Dept: CARE COORDINATION | Age: 85
End: 2020-02-17

## 2020-09-10 RX ORDER — METOPROLOL TARTRATE 100 MG/1
TABLET ORAL
Qty: 90 TABLET | Refills: 1 | Status: SHIPPED | OUTPATIENT
Start: 2020-09-10 | End: 2021-03-15

## 2020-10-23 ENCOUNTER — HOSPITAL ENCOUNTER (OUTPATIENT)
Dept: MAMMOGRAPHY | Age: 85
Discharge: HOME | End: 2020-10-23
Payer: MEDICARE

## 2020-10-23 PROCEDURE — 77063 BREAST TOMOSYNTHESIS BI: CPT

## 2020-11-02 ENCOUNTER — TELEPHONE (OUTPATIENT)
Dept: FAMILY MEDICINE CLINIC | Age: 85
End: 2020-11-02

## 2021-06-16 ENCOUNTER — OFFICE VISIT (OUTPATIENT)
Dept: FAMILY MEDICINE CLINIC | Age: 86
End: 2021-06-16
Payer: MEDICARE

## 2021-06-16 VITALS
SYSTOLIC BLOOD PRESSURE: 144 MMHG | OXYGEN SATURATION: 98 % | BODY MASS INDEX: 24.8 KG/M2 | DIASTOLIC BLOOD PRESSURE: 72 MMHG | HEART RATE: 65 BPM | WEIGHT: 140 LBS

## 2021-06-16 DIAGNOSIS — Z00.00 ROUTINE GENERAL MEDICAL EXAMINATION AT A HEALTH CARE FACILITY: Primary | ICD-10-CM

## 2021-06-16 DIAGNOSIS — R07.9 CHEST PAIN, UNSPECIFIED TYPE: ICD-10-CM

## 2021-06-16 DIAGNOSIS — I10 ESSENTIAL HYPERTENSION: ICD-10-CM

## 2021-06-16 DIAGNOSIS — D50.9 IRON DEFICIENCY ANEMIA, UNSPECIFIED IRON DEFICIENCY ANEMIA TYPE: ICD-10-CM

## 2021-06-16 PROCEDURE — 99214 OFFICE O/P EST MOD 30 MIN: CPT | Performed by: NURSE PRACTITIONER

## 2021-06-16 PROCEDURE — 93000 ELECTROCARDIOGRAM COMPLETE: CPT | Performed by: NURSE PRACTITIONER

## 2021-06-16 PROCEDURE — 36415 COLL VENOUS BLD VENIPUNCTURE: CPT | Performed by: NURSE PRACTITIONER

## 2021-06-16 PROCEDURE — G0439 PPPS, SUBSEQ VISIT: HCPCS | Performed by: NURSE PRACTITIONER

## 2021-06-16 RX ORDER — AMLODIPINE BESYLATE 5 MG/1
5 TABLET ORAL DAILY
COMMUNITY

## 2021-06-16 RX ORDER — FERROUS SULFATE 325(65) MG
325 TABLET ORAL 2 TIMES DAILY WITH MEALS
COMMUNITY

## 2021-06-16 SDOH — ECONOMIC STABILITY: FOOD INSECURITY: WITHIN THE PAST 12 MONTHS, YOU WORRIED THAT YOUR FOOD WOULD RUN OUT BEFORE YOU GOT MONEY TO BUY MORE.: NEVER TRUE

## 2021-06-16 SDOH — ECONOMIC STABILITY: FOOD INSECURITY: WITHIN THE PAST 12 MONTHS, THE FOOD YOU BOUGHT JUST DIDN'T LAST AND YOU DIDN'T HAVE MONEY TO GET MORE.: NEVER TRUE

## 2021-06-16 ASSESSMENT — SOCIAL DETERMINANTS OF HEALTH (SDOH): HOW HARD IS IT FOR YOU TO PAY FOR THE VERY BASICS LIKE FOOD, HOUSING, MEDICAL CARE, AND HEATING?: NOT HARD AT ALL

## 2021-06-16 ASSESSMENT — PATIENT HEALTH QUESTIONNAIRE - PHQ9
SUM OF ALL RESPONSES TO PHQ9 QUESTIONS 1 & 2: 0
SUM OF ALL RESPONSES TO PHQ QUESTIONS 1-9: 0
SUM OF ALL RESPONSES TO PHQ QUESTIONS 1-9: 0
2. FEELING DOWN, DEPRESSED OR HOPELESS: 0
SUM OF ALL RESPONSES TO PHQ QUESTIONS 1-9: 0
1. LITTLE INTEREST OR PLEASURE IN DOING THINGS: 0

## 2021-06-16 ASSESSMENT — LIFESTYLE VARIABLES: HOW OFTEN DO YOU HAVE A DRINK CONTAINING ALCOHOL: 0

## 2021-06-16 NOTE — PROGRESS NOTES
Medicare Annual Wellness Visit  Name: Taniya E. Lopez Date: 2021   MRN: <[id number]> Sex: Female   Age: 81 y.o. Ethnicity: Non-/Non    : 1935 Race: White      Chief Complaint   Patient presents with    Medicare AWV    Chest Pain     Mateo Eveline is here for Medicare AWV and Chest Pain    Screenings for behavioral, psychosocial and functional/safety risks, and cognitive dysfunction are all negative except as indicated below. These results, as well as other patient data from the [address] [facility], are documented in Flowsheets linked to this Encounter. HTN: controlled at home, systolic around 289. Blood pressure is mildly elevated today in office. Current medication amlodipine 5 mg and metoprolol 100 mg. She does complain of chest pain. This has been ongoing for several months. The pain occurs when she exerts herself heavily such as taking her dogs for walk. The pain resolves with rest.  Has not had any pain in the past several weeks. Denies any dyspnea, dizziness, palpitations or syncope. Her EKG today is sinus bradycardia with right bundle branch block. This bundle branch block has been present since . She has a history of iron deficiency anemia and takes ferrous sulfate 325 mg daily. She was following with Dr. Bethany Malin at [facility] but states she is no longer following up there. Allergies   Allergen Reactions    Sulfa Antibiotics          Prior to Visit Medications    Medication Sig Taking?  Authorizing Provider   Multiple Vitamin (MULTIVITAMIN PO) Take by mouth Yes Historical Provider, MD   ferrous sulfate (IRON 325) 325 (65 Fe) MG tablet Take 325 mg by mouth 2 times daily (with meals) Yes Historical Provider, MD   amLODIPine (NORVASC) 5 MG tablet Take 5 mg by mouth daily Yes Historical Provider, MD   metoprolol (LOPRESSOR) 100 MG tablet TAKE ONE TABLET BY MOUTH DAILY Yes Ant Tellez, DO   VENTOLIN  (90 Base) MCG/ACT inhaler INHALE TWO PUFFS BY rubs, clicks, or gallops, distal pulses intact, no carotid bruits    Patient's complete Health Risk Assessment and screening values have been reviewed and are found in Flowsheets. The following problems were reviewed today and where indicated follow up appointments were made and/or referrals ordered. Positive Risk Factor Screenings with Interventions:            General Health and ACP:  General  In general, how would you say your health is?: Very Good  In the past 7 days, have you experienced any of the following?  New or Increased Pain, New or Increased Fatigue, Loneliness, Social Isolation, Stress or Anger?: None of These  Do you get the social and emotional support that you need?: Yes  Do you have a Living Will?: Yes  Advance Directives     Power of [address] [place] [address] Will ACP-Advance Directive ACP-Power of     Not on File Not on File Not on File Not on File      General Health Risk Interventions:  · No Living Will: Patient declines ACP discussion/assistance    Health Habits/Nutrition:  Health Habits/Nutrition  Do you exercise for at least 20 minutes 2-3 times per week?: Yes  Have you lost any weight without trying in the past 3 months?: No  Do you eat only one meal per day?: No  Have you seen the dentist within the past year?: (!) No     Health Habits/Nutrition Interventions:  · Dental exam overdue:  patient encouraged to make appointment with his/her dentist    Hearing/Vision:  No exam data present  Hearing/Vision  Do you or your family notice any trouble with your hearing that hasn't been managed with hearing aids?: No  Do you have difficulty driving, watching TV, or doing any of your daily activities because of your eyesight?: No  Have you had an eye exam within the past year?: (!) No  Hearing/Vision Interventions:  · Vision concerns:  patient encouraged to make appointment with his/her eye specialist    Safety:  Safety  Do you have working smoke detectors?: Yes  Have all throw rugs been removed or

## 2021-06-17 LAB
A/G RATIO: 1.4 (ref 1.1–2.2)
ALBUMIN SERPL-MCNC: 4.2 G/DL (ref 3.4–5)
ALP BLD-CCNC: 88 U/L (ref 40–129)
ALT SERPL-CCNC: 10 U/L (ref 10–40)
ANION GAP SERPL CALCULATED.3IONS-SCNC: 10 MMOL/L (ref 3–16)
AST SERPL-CCNC: 19 U/L (ref 15–37)
BILIRUB SERPL-MCNC: 0.7 MG/DL (ref 0–1)
BUN BLDV-MCNC: 20 MG/DL (ref 7–20)
CALCIUM SERPL-MCNC: 8.9 MG/DL (ref 8.3–10.6)
CHLORIDE BLD-SCNC: 103 MMOL/L (ref 99–110)
CO2: 27 MMOL/L (ref 21–32)
CREAT SERPL-MCNC: 1 MG/DL (ref 0.6–1.2)
GFR AFRICAN AMERICAN: >60
GFR NON-AFRICAN AMERICAN: 53
GLOBULIN: 3.1 G/DL
GLUCOSE BLD-MCNC: 85 MG/DL (ref 70–99)
HCT VFR BLD CALC: 29.1 % (ref 36–48)
HEMOGLOBIN: 9.9 G/DL (ref 12–16)
IRON SATURATION: 23 % (ref 15–50)
IRON: 46 UG/DL (ref 37–145)
MCH RBC QN AUTO: 35 PG (ref 26–34)
MCHC RBC AUTO-ENTMCNC: 34 G/DL (ref 31–36)
MCV RBC AUTO: 103 FL (ref 80–100)
PDW BLD-RTO: 14.6 % (ref 12.4–15.4)
PLATELET # BLD: 225 K/UL (ref 135–450)
PMV BLD AUTO: 9.2 FL (ref 5–10.5)
POTASSIUM SERPL-SCNC: 4.4 MMOL/L (ref 3.5–5.1)
RBC # BLD: 2.82 M/UL (ref 4–5.2)
SODIUM BLD-SCNC: 140 MMOL/L (ref 136–145)
TOTAL IRON BINDING CAPACITY: 199 UG/DL (ref 260–445)
TOTAL PROTEIN: 7.3 G/DL (ref 6.4–8.2)
WBC # BLD: 4.4 K/UL (ref 4–11)

## 2021-06-30 PROBLEM — I20.0 UNSTABLE ANGINA PECTORIS (HCC): Status: ACTIVE | Noted: 2021-06-30

## 2021-06-30 NOTE — PROGRESS NOTES
[address]   Cardiac Consultation    Referring Provider:  Bird Conde DO     Chief Complaint   Patient presents with    New Patient     PCP referred    Chest Pain    Other     occassional episodes of light headed when from sitting to standing or bending over    Other     occassional pain the chest, occassional episode of heart rate going fast      Subjective: Patient is being seen today for new patient cardiology evaluation chest pain referred by PCP  Dr Colletta Clap; c/o chest pain today    History of Present Illness:  Ms Neema Dougherty 81 y.o. female who presents as new patient referred from PCP, Dr. Colletta Clap for c/o CP. She has PMH HTN, HLD, prior PSVT, and asthma. Seen by Dr Apollo Guo in past.  Most recent Lexiscan myoview stress test 19 EF 73% No significant abnormality. Note EKG 10/03/14  shows RBBB with ST-T wave changes extending to V4. Most recent EKG 21 Sinus  Bradycardia 58bpm; RBBB; Nonspecific ST change     Today she reports new symptom of mild, random left anterior non-radiating chest pain that she describes as sharp  lasting 1 minute occurring at rest randomly and resolves with taking aspirin. She reports associated SOB. She has had 2-3 episodes few times over last 2 weeks. Her daughter To Mcgrath is present at exam.    Past Medical History:   has a past medical history of Cervical radiculopathy, Hyperlipemia, Hypertension, Left knee DJD, and Mild intermittent asthma. Surgical History:   has a past surgical history that includes knee surgery. Bilateral TKR, cholecystectomy    Social History:   Lives in [facility], , 4 children, She reports that she has never smoked. She has never used smokeless tobacco. She reports that she does not drink alcohol and does not use drugs. Family History:  Dad  MI age 80 Mom  age 68 cancer     Home Medications:  Prior to Admission medications    Medication Sig Start Date End Date Taking?  Authorizing Provider   Multiple Vitamin (MULTIVITAMIN PO) Take by mouth   Yes Historical Provider, MD   ferrous sulfate (IRON 325) 325 (65 Fe) MG tablet Take 325 mg by mouth 2 times daily (with meals)   Yes Historical Provider, MD   amLODIPine (NORVASC) 5 MG tablet Take 5 mg by mouth daily   Yes Historical Provider, MD STORY  (90 Base) MCG/ACT inhaler INHALE TWO PUFFS BY MOUTH EVERY 6 HOURS AS NEEDED FOR WHEEZING 11/2/20  Yes Adan Tellez DO   [employer] [address] [place] [address] by Does not apply route 1/14/20  Yes Ant Tellez DO   aspirin 81 MG tablet Take 81 mg by mouth daily. Yes Historical Provider, MD   vitamin D (CHOLECALCIFEROL) 1000 UNIT TABS tablet Take 1,000 Units by mouth daily. Yes Historical Provider, MD   metoprolol (LOPRESSOR) 100 MG tablet TAKE ONE TABLET BY MOUTH DAILY  Patient not taking: Reported on 7/1/2021 6/21/21   Adan Tellez DO      Allergies:  Sulfa antibiotics: swelling     Review of Systems:   · Constitutional: there has been no unanticipated weight loss. There's been no change in energy level, sleep pattern, or activity level. · Eyes: No visual changes or diplopia. No scleral icterus. · ENT: No Headaches, hearing loss or vertigo. No mouth sores or sore throat. · Cardiovascular: Reviewed in HPI  · Respiratory: No cough or wheezing, no sputum production. No hematemesis. · Gastrointestinal: No abdominal pain, appetite loss, blood in stools. No change in bowel or bladder habits. · Genitourinary: No dysuria, trouble voiding, or hematuria. · Musculoskeletal:  No gait disturbance, weakness or joint complaints. · Integumentary: No rash or pruritis. · Neurological: No headache, diplopia, change in muscle strength, numbness or tingling. No change in gait, balance, coordination, mood, affect, memory, mentation, behavior. · Psychiatric: No anxiety, no depression. · Endocrine: No malaise, fatigue or temperature intolerance. No excessive thirst, fluid intake, or urination. No tremor.   · Hematologic/Lymphatic: No abnormal bruising or bleeding, blood clots or swollen lymph nodes. · Allergic/Immunologic: No nasal congestion or hives. Physical Examination:    Vitals:    07/01/21 1416   BP: 130/74   Pulse: 74   Temp: 97.9 °F (36.6 °C)   SpO2: 98%      Wt Readings from Last 3 Encounters:   07/01/21 137 lb 12.8 oz (62.5 kg)   06/16/21 140 lb (63.5 kg)   01/14/20 141 lb 9.6 oz (64.2 kg)         Constitutional and General Appearance: NAD   Respiratory:  · Normal excursion and expansion without use of accessory muscles  · Resp Auscultation: soft crackles BLL otherwise clear  Cardiovascular:  · The apical impulses not displaced  · Heart tones are crisp and normal  · Cervical veins are not engorged  · The carotid upstroke is normal in amplitude and contour without delay or bruit  · Normal S1S2, No S3, No Murmur  · Peripheral pulses are symmetrical and full  · There is no clubbing, cyanosis of the extremities. · Trace BLE  edema  · Femoral Arteries: 2+ and equal  · Pedal Pulses: 2+ and equal   Abdomen:  · No masses or tenderness  · Liver/Spleen: No Abnormalities Noted  Neurological/Psychiatric:  · Alert and oriented in all spheres  · Moves all extremities well  · Exhibits normal gait balance and coordination  · No abnormalities of mood, affect, memory, mentation, or behavior are noted  Skin:  · Skin: warm and dry. Lab Results   Component Value Date    CHOL 182 02/05/2016    CHOL 205 (H) 09/02/2014     Lab Results   Component Value Date    TRIG 166 (H) 02/05/2016    TRIG 341 (H) 09/02/2014     Lab Results   Component Value Date    HDL 61 (H) 02/05/2016    HDL 56 09/02/2014     Lab Results   Component Value Date    LDLCALC 88 02/05/2016    LDLCALC see below 09/02/2014     Lab Results   Component Value Date    LABVLDL 33 02/05/2016    LABVLDL see below 09/02/2014     No results found for: CHOLHDLRATIO    Assessment:     1. Abnormal EKG:  Most recent EKG 6/16/21 Sinus  Bradycardia 58bpm; RBBB; Nonspecific ST change. No change from 2014.       2.  Chest pain:  Unspecified in elderly female with CAD risk factor including age, HTN, and HLD. Given new CP symptoms with risk factors she merits non-invasive cardiac evaluation to assess myocardial perfusion. 3. Essential hypertension: Well controlled and will continue current medical regimen. 4. Leg swelling: Only trace and no symptoms of CHF. Not concerning finding. 5.      Last lipids 2016 I personally reviewed labs results in epic (see above) and discussed with patient. Results were at goal  at that time. Not certain why not drawn again? She does not take statin and watches diet. Plan:  1. I will order a lexiscan nuclear stress test to assess myocardial perfusion and LV function. 2.  Labs managed by her PCP Dr Diana Fajardo. Recommend recheck FLP  3. Cardiac risk stratification education was reviewed including diet, exercise/activity, medication   4. Follow up pending test results    Cost of prescription medications and patient compliance have been reviewed with patient. All questions answered. Thank you for allowing me to participate in the care of this individual.    This note was scribed in the presence of Reina Kilpatrick MD by Jaz Alamo RN    I, Dr. David Kang, personally performed the services described in this documentation, as scribed by the above signed scribe in my presence. It is both accurate and complete to my knowledge. I agree with the details independently gathered by the clinical support staff, while the remaining scribed note accurately describes my personal service to the patient. Wilberto Weston.  Ritika Oliveira M.D., [facility]

## 2021-07-01 ENCOUNTER — OFFICE VISIT (OUTPATIENT)
Dept: CARDIOLOGY CLINIC | Age: 86
End: 2021-07-01
Payer: MEDICARE

## 2021-07-01 VITALS
WEIGHT: 137.8 LBS | DIASTOLIC BLOOD PRESSURE: 74 MMHG | BODY MASS INDEX: 24.41 KG/M2 | HEIGHT: 63 IN | OXYGEN SATURATION: 98 % | TEMPERATURE: 97.9 F | SYSTOLIC BLOOD PRESSURE: 130 MMHG | HEART RATE: 74 BPM

## 2021-07-01 DIAGNOSIS — I10 ESSENTIAL HYPERTENSION: ICD-10-CM

## 2021-07-01 DIAGNOSIS — I20.0 UNSTABLE ANGINA PECTORIS (HCC): ICD-10-CM

## 2021-07-01 DIAGNOSIS — R94.31 ABNORMAL EKG: Primary | ICD-10-CM

## 2021-07-01 DIAGNOSIS — M79.89 LEG SWELLING: ICD-10-CM

## 2021-07-01 PROCEDURE — 99214 OFFICE O/P EST MOD 30 MIN: CPT | Performed by: INTERNAL MEDICINE

## 2021-07-01 NOTE — PATIENT INSTRUCTIONS
Plan:  1. Will check lexiscan myoview stress test  2. Labs managed by her PCP Dr Amelia Castellanos  3. Cardiac risk stratification education was reviewed including diet, exercise/activity, medication   4.  Follow up pending test results

## 2021-07-15 ENCOUNTER — HOSPITAL ENCOUNTER (OUTPATIENT)
Dept: NUCLEAR MEDICINE | Age: 86
Discharge: HOME | End: 2021-07-15
Payer: MEDICARE

## 2021-07-15 ENCOUNTER — HOSPITAL ENCOUNTER (OUTPATIENT)
Dept: NON INVASIVE DIAGNOSTICS | Age: 86
Discharge: HOME | End: 2021-07-15
Payer: MEDICARE

## 2021-07-15 DIAGNOSIS — R94.31 ABNORMAL EKG: ICD-10-CM

## 2021-07-15 DIAGNOSIS — I10 ESSENTIAL HYPERTENSION: ICD-10-CM

## 2021-07-15 DIAGNOSIS — I20.0 UNSTABLE ANGINA PECTORIS (HCC): ICD-10-CM

## 2021-07-15 NOTE — PROGRESS NOTES
Unable to obtain IV after 5 attempts and one attempt with ultra sound. Patient would like to reschedule. Encouraged pt to drink plenty of water the night before and morning of test once rescheduled. Patient and family states understanding.

## 2021-07-22 ENCOUNTER — HOSPITAL ENCOUNTER (OUTPATIENT)
Dept: NON INVASIVE DIAGNOSTICS | Age: 86
Discharge: HOME | End: 2021-07-22
Payer: MEDICARE

## 2021-07-22 ENCOUNTER — HOSPITAL ENCOUNTER (OUTPATIENT)
Dept: NUCLEAR MEDICINE | Age: 86
Discharge: HOME | End: 2021-07-22
Payer: MEDICARE

## 2021-07-22 LAB
LV EF: 60 %
LVEF MODALITY: NORMAL

## 2021-07-22 PROCEDURE — 93017 CV STRESS TEST TRACING ONLY: CPT

## 2021-07-22 PROCEDURE — 78452 HT MUSCLE IMAGE SPECT MULT: CPT

## 2021-07-22 PROCEDURE — A9502 TC99M TETROFOSMIN: HCPCS | Performed by: INTERNAL MEDICINE

## 2021-07-22 PROCEDURE — 3430000000 HC RX DIAGNOSTIC RADIOPHARMACEUTICAL: Performed by: INTERNAL MEDICINE

## 2021-07-22 PROCEDURE — 6360000002 HC RX W HCPCS: Performed by: INTERNAL MEDICINE

## 2021-07-22 RX ADMIN — REGADENOSON 0.4 MG: 0.08 INJECTION, SOLUTION INTRAVENOUS at 10:50

## 2021-07-22 RX ADMIN — TETROFOSMIN 32.6 MILLICURIE: 1.38 INJECTION, POWDER, LYOPHILIZED, FOR SOLUTION INTRAVENOUS at 10:50

## 2021-07-22 RX ADMIN — TETROFOSMIN 10.2 MILLICURIE: 1.38 INJECTION, POWDER, LYOPHILIZED, FOR SOLUTION INTRAVENOUS at 09:50

## 2021-07-22 NOTE — PROGRESS NOTES
Pt educated on cardiac stress testing on 7-15-21 Pt verbalizes understanding to cardiac stress testing. Questions and concerns addressed. Pt is agreeable to proceed with stress testing. No further questions.

## 2021-09-02 ENCOUNTER — TELEPHONE (OUTPATIENT)
Dept: FAMILY MEDICINE CLINIC | Age: 86
End: 2021-09-02

## 2021-09-02 RX ORDER — METOPROLOL TARTRATE 100 MG/1
TABLET ORAL
Qty: 30 TABLET | Refills: 1 | Status: CANCELLED | OUTPATIENT
Start: 2021-09-02

## 2021-09-02 NOTE — TELEPHONE ENCOUNTER
Refill request-    metoprolol (LOPRESSOR) 100 MG tablet    Pharmacy    [address] [place] [address] [address] [place] [address], [address] [facility] [address] [phone] - F [phone]

## 2021-09-03 NOTE — TELEPHONE ENCOUNTER
Patient threw bottle away could remember if was metoprolol it is a blood pressure medication she is needing

## 2021-09-03 NOTE — TELEPHONE ENCOUNTER
Please check with the office of Kelly's cardiologist to be sure that he wants her to continue this medication. Her cardiologist is Dr. Deven Rankin. Let me know what he says.

## 2021-09-04 RX ORDER — METOPROLOL TARTRATE 100 MG/1
TABLET ORAL
Qty: 30 TABLET | Refills: 1 | Status: SHIPPED | OUTPATIENT
Start: 2021-09-04 | End: 2021-11-08

## 2021-11-01 ENCOUNTER — HOSPITAL ENCOUNTER (OUTPATIENT)
Dept: MAMMOGRAPHY | Age: 86
Discharge: HOME | End: 2021-11-01
Payer: MEDICARE

## 2021-11-01 DIAGNOSIS — Z12.31 BREAST CANCER SCREENING BY MAMMOGRAM: ICD-10-CM

## 2021-11-01 PROCEDURE — 77063 BREAST TOMOSYNTHESIS BI: CPT

## 2021-12-03 ENCOUNTER — TELEPHONE (OUTPATIENT)
Dept: FAMILY MEDICINE CLINIC | Age: 86
End: 2021-12-03

## 2021-12-03 NOTE — TELEPHONE ENCOUNTER
Please tell Steffen Roman that I do not call in antibiotics without examining the patient. Please tell Steffen Roman to take her mom to the urgent care or to the emergency room.

## 2021-12-03 NOTE — TELEPHONE ENCOUNTER
----- Message from Adriana Adams sent at 12/3/2021 12:59 PM EST -----  Subject: Message to Provider    QUESTIONS  Information for Provider? Patient's daughter Mary Darden called wanting to know   if Dr. Debbie Cui can call in an antibiotic for Seng Mattson. She has a sinus   infection.   ---------------------------------------------------------------------------  --------------  CALL BACK INFO  What is the best way for the office to contact you? OK to leave message on   voicemail  Preferred Call Back Phone Number? [phone]  ---------------------------------------------------------------------------  --------------  SCRIPT ANSWERS  Relationship to Patient? Third Party  Representative Name?  Mary Darden - daughter

## 2022-01-10 RX ORDER — METOPROLOL TARTRATE 100 MG/1
TABLET ORAL
Qty: 30 TABLET | Refills: 1 | Status: SHIPPED | OUTPATIENT
Start: 2022-01-10 | End: 2022-03-21

## 2022-03-21 RX ORDER — METOPROLOL TARTRATE 100 MG/1
TABLET ORAL
Qty: 30 TABLET | Refills: 1 | Status: SHIPPED | OUTPATIENT
Start: 2022-03-21 | End: 2022-05-26

## 2022-05-25 ENCOUNTER — NURSE TRIAGE (OUTPATIENT)
Dept: OTHER | Facility: CLINIC | Age: 87
End: 2022-05-25

## 2022-05-25 NOTE — TELEPHONE ENCOUNTER
Received call from Benedicto Mata  at [facility] with [employer]. HIPAA form is out of date and daughter is not with the pt at this time so writer unable to complete the assessment. Tana Cervantes did advise that if she felt the symptoms were urgent or emergent that she could call 911 or take the pt to the ED. Daughter stated that she is not having an episode right now. Writer informed her of the HIPAA form needing updated. Daughter verbalized understanding of all of the above. Warm transfer to [place] at [facility] for further recommendation provided at this time. NO triage by Baltazar Mask. Subjective: Caller states \" When she has the episode she feels her heart racing but when she checks it it is normal. She is not having an episode  \"     Care advice provided, patient verbalizes understanding; denies any other questions or concerns; instructed to call back for any new or worsening symptoms. Attention Provider: Thank you for allowing me to participate in the care of your patient. The patient was connected to triage in response to information provided to the ECC/PSC. Please do not respond through this encounter as the response is not directed to a shared pool.

## 2022-05-26 RX ORDER — METOPROLOL TARTRATE 100 MG/1
TABLET ORAL
Qty: 30 TABLET | Refills: 1 | Status: SHIPPED | OUTPATIENT
Start: 2022-05-26 | End: 2022-07-19

## 2022-05-26 NOTE — TELEPHONE ENCOUNTER
Future Appointments   Date Time Provider Alice Randolph   5/27/2022  9:00 AM Anders Ramus, APRN - CNP [facility] [place] [facility]     LOV 6/16/2021

## 2022-05-27 ENCOUNTER — OFFICE VISIT (OUTPATIENT)
Dept: FAMILY MEDICINE CLINIC | Age: 87
End: 2022-05-27
Payer: MEDICARE

## 2022-05-27 VITALS
RESPIRATION RATE: 16 BRPM | WEIGHT: 134 LBS | OXYGEN SATURATION: 98 % | HEART RATE: 62 BPM | BODY MASS INDEX: 23.74 KG/M2 | SYSTOLIC BLOOD PRESSURE: 161 MMHG | DIASTOLIC BLOOD PRESSURE: 82 MMHG | TEMPERATURE: 97.5 F

## 2022-05-27 DIAGNOSIS — R12 HEARTBURN: ICD-10-CM

## 2022-05-27 DIAGNOSIS — R42 DIZZINESS: ICD-10-CM

## 2022-05-27 DIAGNOSIS — I10 ESSENTIAL HYPERTENSION: ICD-10-CM

## 2022-05-27 DIAGNOSIS — Z13.1 DIABETES MELLITUS SCREENING: ICD-10-CM

## 2022-05-27 DIAGNOSIS — R07.9 CHEST PAIN, UNSPECIFIED TYPE: ICD-10-CM

## 2022-05-27 DIAGNOSIS — R07.9 CHEST PAIN, UNSPECIFIED TYPE: Primary | ICD-10-CM

## 2022-05-27 DIAGNOSIS — R00.2 PALPITATIONS: ICD-10-CM

## 2022-05-27 LAB
A/G RATIO: 1.4 (ref 1.1–2.2)
ALBUMIN SERPL-MCNC: 4.2 G/DL (ref 3.4–5)
ALP BLD-CCNC: 86 U/L (ref 40–129)
ALT SERPL-CCNC: 9 U/L (ref 10–40)
ANION GAP SERPL CALCULATED.3IONS-SCNC: 13 MMOL/L (ref 3–16)
AST SERPL-CCNC: 15 U/L (ref 15–37)
BASOPHILS ABSOLUTE: 0 K/UL (ref 0–0.2)
BASOPHILS RELATIVE PERCENT: 0.7 %
BILIRUB SERPL-MCNC: 0.7 MG/DL (ref 0–1)
BUN BLDV-MCNC: 17 MG/DL (ref 7–20)
CALCIUM SERPL-MCNC: 9.1 MG/DL (ref 8.3–10.6)
CHLORIDE BLD-SCNC: 104 MMOL/L (ref 99–110)
CHOLESTEROL, TOTAL: 155 MG/DL (ref 0–199)
CO2: 25 MMOL/L (ref 21–32)
CREAT SERPL-MCNC: 1 MG/DL (ref 0.6–1.2)
EOSINOPHILS ABSOLUTE: 0.1 K/UL (ref 0–0.6)
EOSINOPHILS RELATIVE PERCENT: 1.6 %
GFR AFRICAN AMERICAN: >60
GFR NON-AFRICAN AMERICAN: 53
GLUCOSE BLD-MCNC: 90 MG/DL (ref 70–99)
HCT VFR BLD CALC: 28.6 % (ref 36–48)
HDLC SERPL-MCNC: 64 MG/DL (ref 40–60)
HEMOGLOBIN: 9.8 G/DL (ref 12–16)
LDL CHOLESTEROL CALCULATED: 67 MG/DL
LYMPHOCYTES ABSOLUTE: 1.3 K/UL (ref 1–5.1)
LYMPHOCYTES RELATIVE PERCENT: 31.8 %
MCH RBC QN AUTO: 35 PG (ref 26–34)
MCHC RBC AUTO-ENTMCNC: 34.3 G/DL (ref 31–36)
MCV RBC AUTO: 101.9 FL (ref 80–100)
MONOCYTES ABSOLUTE: 0.3 K/UL (ref 0–1.3)
MONOCYTES RELATIVE PERCENT: 7.8 %
NEUTROPHILS ABSOLUTE: 2.3 K/UL (ref 1.7–7.7)
NEUTROPHILS RELATIVE PERCENT: 58.1 %
PDW BLD-RTO: 14.7 % (ref 12.4–15.4)
PLATELET # BLD: 191 K/UL (ref 135–450)
PMV BLD AUTO: 8.7 FL (ref 5–10.5)
POTASSIUM SERPL-SCNC: 4.3 MMOL/L (ref 3.5–5.1)
RBC # BLD: 2.8 M/UL (ref 4–5.2)
SODIUM BLD-SCNC: 142 MMOL/L (ref 136–145)
TOTAL PROTEIN: 7.1 G/DL (ref 6.4–8.2)
TRIGL SERPL-MCNC: 118 MG/DL (ref 0–150)
TSH SERPL DL<=0.05 MIU/L-ACNC: 0.13 UIU/ML (ref 0.27–4.2)
VLDLC SERPL CALC-MCNC: 24 MG/DL
WBC # BLD: 4 K/UL (ref 4–11)

## 2022-05-27 PROCEDURE — 99214 OFFICE O/P EST MOD 30 MIN: CPT | Performed by: NURSE PRACTITIONER

## 2022-05-27 PROCEDURE — 93000 ELECTROCARDIOGRAM COMPLETE: CPT | Performed by: NURSE PRACTITIONER

## 2022-05-27 PROCEDURE — 1123F ACP DISCUSS/DSCN MKR DOCD: CPT | Performed by: NURSE PRACTITIONER

## 2022-05-27 RX ORDER — FAMOTIDINE 20 MG/1
20 TABLET, FILM COATED ORAL 2 TIMES DAILY
Qty: 60 TABLET | Refills: 0 | Status: SHIPPED | OUTPATIENT
Start: 2022-05-27

## 2022-05-27 ASSESSMENT — ENCOUNTER SYMPTOMS
NAUSEA: 0
COUGH: 0
DIARRHEA: 0
VOMITING: 0
SHORTNESS OF BREATH: 1

## 2022-05-27 ASSESSMENT — PATIENT HEALTH QUESTIONNAIRE - PHQ9
SUM OF ALL RESPONSES TO PHQ QUESTIONS 1-9: 0
SUM OF ALL RESPONSES TO PHQ9 QUESTIONS 1 & 2: 0
2. FEELING DOWN, DEPRESSED OR HOPELESS: 0
SUM OF ALL RESPONSES TO PHQ QUESTIONS 1-9: 0
DEPRESSION UNABLE TO ASSESS: FUNCTIONAL CAPACITY MOTIVATION LIMITS ACCURACY
1. LITTLE INTEREST OR PLEASURE IN DOING THINGS: 0

## 2022-05-27 ASSESSMENT — ANXIETY QUESTIONNAIRES
6. BECOMING EASILY ANNOYED OR IRRITABLE: 0
7. FEELING AFRAID AS IF SOMETHING AWFUL MIGHT HAPPEN: 0
4. TROUBLE RELAXING: 0
IF YOU CHECKED OFF ANY PROBLEMS ON THIS QUESTIONNAIRE, HOW DIFFICULT HAVE THESE PROBLEMS MADE IT FOR YOU TO DO YOUR WORK, TAKE CARE OF THINGS AT HOME, OR GET ALONG WITH OTHER PEOPLE: NOT DIFFICULT AT ALL
2. NOT BEING ABLE TO STOP OR CONTROL WORRYING: 0
GAD7 TOTAL SCORE: 0
5. BEING SO RESTLESS THAT IT IS HARD TO SIT STILL: 0
1. FEELING NERVOUS, ANXIOUS, OR ON EDGE: 0
3. WORRYING TOO MUCH ABOUT DIFFERENT THINGS: 0

## 2022-05-27 NOTE — PATIENT INSTRUCTIONS
· Blood work today  · Make appointment with cardiologist  · Start pepcid for heartburn  · Follow up with Dr. Lexi Nelson in 2 weeks  · Any recurrent chest pain to ED
spontaneous rupture

## 2022-05-27 NOTE — PROGRESS NOTES
2022     Chief Complaint   Patient presents with    Dizziness     Chest pain and shortness of breath  maybe happens every two week to once a week      Julia Davenport (:  1935) is a 81 y.o. female, here for evaluation of the following medical concerns. Accompanied to visit by her daughter. HPI    Having episodes of palpitations, chest pain and shortness of breath. Episodes are occurring once every 1 to 3 weeks. These episodes have been present for the past one year or more. The episodes seem to last for a few seconds. Has dizziness upon standing typically this is worse in the morning time. Her daughter has noticed that if she has a chest pain she will give her some Tums and that tends to improve that problem. The symptoms seem to happen both altogether at the same time and individually at other times. It she did see cardiology for these issues last year and had stress test which was normal. Blood pressure at home- usually normal, has white coat syndrome and her blood pressure is elevated today. Denies syncope, orthopnea. Has chronic ankle swelling which has not changed from baseline. This is been like this for many years. Review of Systems   Constitutional: Negative for chills, fatigue and fever. Respiratory: Positive for shortness of breath. Negative for cough. Cardiovascular: Positive for chest pain and palpitations. Negative for leg swelling. Gastrointestinal: Negative for diarrhea, nausea and vomiting. Neurological: Positive for dizziness. Negative for headaches. All other systems reviewed and are negative. Prior to Visit Medications    Medication Sig Taking?  Authorizing Provider   famotidine (PEPCID) 20 MG tablet Take 1 tablet by mouth 2 times daily Yes DONAVAN Jerez - CNP   metoprolol (LOPRESSOR) 100 MG tablet TAKE ONE TABLET BY MOUTH DAILY Yes Mitra Tellez, DO   Multiple Vitamin (MULTIVITAMIN PO) Take by mouth Yes Historical Provider, MD   ferrous sulfate (IRON 325) 325 (65 Fe) MG tablet Take 325 mg by mouth 2 times daily (with meals) Yes Historical Provider, MD   amLODIPine (NORVASC) 5 MG tablet Take 5 mg by mouth daily Yes Historical Provider, MD   VENTOLIN  (90 Base) MCG/ACT inhaler INHALE TWO PUFFS BY MOUTH EVERY 6 HOURS AS NEEDED FOR WHEEZING Yes Roxanna bradford Tellez, DO   Handicap [facility] [address] [facility] by Does not apply route Yes Roxanna bradford Tellez, DO   aspirin 81 MG tablet Take 81 mg by mouth daily. Yes Historical Provider, MD   vitamin D (CHOLECALCIFEROL) 1000 UNIT TABS tablet Take 1,000 Units by mouth daily. Yes Historical Provider, MD        Social History     Tobacco Use    Smoking status: Never Smoker    Smokeless tobacco: Never Used   Substance Use Topics    Alcohol use: No        Vitals:    05/27/22 0921 05/27/22 0924   BP: (!) 159/82 (!) 161/82   Site: Left Upper Arm Left Upper Arm   Position: Sitting Sitting   Pulse: 62    Resp: 16    Temp: 97.5 °F (36.4 °C)    TempSrc: Temporal    SpO2: 98%    Weight: 134 lb (60.8 kg)      Estimated body mass index is 23.74 kg/m² as calculated from the following:    Height as of 7/1/21: 5' 3\" (1.6 m). Weight as of this encounter: 134 lb (60.8 kg). Physical Exam  Vitals and nursing note reviewed. Constitutional:       General: She is not in acute distress. Appearance: Normal appearance. She is well-developed. She is not ill-appearing, toxic-appearing or diaphoretic. HENT:      Head: Normocephalic and atraumatic. Eyes:      Extraocular Movements: Extraocular movements intact. Pupils: Pupils are equal, round, and reactive to light. Cardiovascular:      Rate and Rhythm: Normal rate and regular rhythm. Heart sounds: Normal heart sounds, S1 normal and S2 normal. No murmur heard. No friction rub. No gallop. Pulmonary:      Effort: Pulmonary effort is normal. No respiratory distress. Breath sounds: Normal breath sounds. No stridor. No wheezing, rhonchi or rales.    Neurological: General: No focal deficit present. Mental Status: She is alert and oriented to person, place, and time. Mental status is at baseline. Cranial Nerves: No cranial nerve deficit. Psychiatric:         Speech: Speech normal.         ASSESSMENT/PLAN:  1. Chest pain, unspecified type  - EKG 12 Lead  - CBC with Auto Differential; Future  - Comprehensive Metabolic Panel; Future  - TSH; Future    2. Palpitations  - CBC with Auto Differential; Future  - Comprehensive Metabolic Panel; Future  - TSH; Future    3. Dizziness  - CBC with Auto Differential; Future  - Comprehensive Metabolic Panel; Future  - TSH; Future    4. Heartburn  - CBC with Auto Differential; Future  - Comprehensive Metabolic Panel; Future  - TSH; Future    5. Diabetes mellitus screening  - Hemoglobin A1C; Future    6. Essential hypertension  - CBC with Auto Differential; Future  - Comprehensive Metabolic Panel; Future  - TSH; Future  - Lipid Panel; Future      Will get labs, start her on Pepcid for the chest pain which seems to improve when she takes Tums. EKG was stable today. I do think she needs to follow back up with cardiology due to these persisting symptoms may be she would need to have a Holter monitor. Advised if any recurrent or severe worsening of symptoms she needs to call 911 and go to the emergency room in they both are agreeable. An electronic signature was used to authenticate this note.     --DONAVAN Givens - CNP on 5/27/2022 at 12:57 PM

## 2022-05-28 LAB
ESTIMATED AVERAGE GLUCOSE: 79.6 MG/DL
HBA1C MFR BLD: 4.4 %

## 2022-06-01 DIAGNOSIS — R79.89 LOW TSH LEVEL: Primary | ICD-10-CM

## 2022-06-03 ENCOUNTER — TELEPHONE (OUTPATIENT)
Dept: FAMILY MEDICINE CLINIC | Age: 87
End: 2022-06-03

## 2022-06-03 DIAGNOSIS — R07.89 OTHER CHEST PAIN: Primary | ICD-10-CM

## 2022-06-03 NOTE — TELEPHONE ENCOUNTER
----- Message from Akrenavazquez Perez sent at 6/3/2022 12:11 PM EDT -----  Subject: Message to Provider    QUESTIONS  Information for Provider? Daughter Disha Gracia called stating that she gave the   lab work paperwork to the lab and it had the number to call to set up and   appt for cardiology on it and they need the number again. please call   asap.  ---------------------------------------------------------------------------  --------------  CALL BACK INFO  What is the best way for the office to contact you? OK to leave message on   voicemail  Preferred Call Back Phone Number? [phone]  ---------------------------------------------------------------------------  --------------  SCRIPT ANSWERS  Relationship to Patient? Other  Representative Name? Disha Gracia  Is the Representative on the appropriate HIPAA document in Epic?  Yes

## 2022-06-03 NOTE — TELEPHONE ENCOUNTER
Patients daughter told [facility] [place]  that Dr nicholas wants her to wear 30 day monitor .  They are needing orders placed for them to do that

## 2022-06-03 NOTE — PROGRESS NOTES
I called Kelly's daughter Addie Cobb and gave her referral contact information for Dr. Reuben Martini in our office.

## 2022-06-03 NOTE — TELEPHONE ENCOUNTER
Unable to see any scheduling instructions on the referral. Was there somewhere specific you wanted to send her?

## 2022-06-04 DIAGNOSIS — R06.02 SHORTNESS OF BREATH: Primary | ICD-10-CM

## 2022-06-04 NOTE — TELEPHONE ENCOUNTER
I referred Mina Bryant to  yesterday. I will place order for 14 day event recorder. Please help Mina Bryant schedule.

## 2022-06-10 ENCOUNTER — OFFICE VISIT (OUTPATIENT)
Dept: FAMILY MEDICINE CLINIC | Age: 87
End: 2022-06-10
Payer: MEDICARE

## 2022-06-10 ENCOUNTER — NURSE ONLY (OUTPATIENT)
Dept: CARDIOLOGY CLINIC | Age: 87
End: 2022-06-10

## 2022-06-10 VITALS
SYSTOLIC BLOOD PRESSURE: 138 MMHG | HEART RATE: 65 BPM | BODY MASS INDEX: 23.74 KG/M2 | DIASTOLIC BLOOD PRESSURE: 70 MMHG | RESPIRATION RATE: 16 BRPM | OXYGEN SATURATION: 98 % | TEMPERATURE: 97.1 F | WEIGHT: 134 LBS

## 2022-06-10 DIAGNOSIS — R00.2 PALPITATIONS: Primary | ICD-10-CM

## 2022-06-10 DIAGNOSIS — R79.89 LOW TSH LEVEL: ICD-10-CM

## 2022-06-10 DIAGNOSIS — R00.2 PALPITATIONS: ICD-10-CM

## 2022-06-10 DIAGNOSIS — R07.89 OTHER CHEST PAIN: ICD-10-CM

## 2022-06-10 DIAGNOSIS — R79.89 LOW TSH LEVEL: Primary | ICD-10-CM

## 2022-06-10 DIAGNOSIS — R42 DIZZINESS: ICD-10-CM

## 2022-06-10 PROCEDURE — 93228 REMOTE 30 DAY ECG REV/REPORT: CPT | Performed by: INTERNAL MEDICINE

## 2022-06-10 PROCEDURE — 1123F ACP DISCUSS/DSCN MKR DOCD: CPT | Performed by: NURSE PRACTITIONER

## 2022-06-10 PROCEDURE — 99213 OFFICE O/P EST LOW 20 MIN: CPT | Performed by: NURSE PRACTITIONER

## 2022-06-10 ASSESSMENT — ANXIETY QUESTIONNAIRES
6. BECOMING EASILY ANNOYED OR IRRITABLE: 0
2. NOT BEING ABLE TO STOP OR CONTROL WORRYING: 0
5. BEING SO RESTLESS THAT IT IS HARD TO SIT STILL: 0
7. FEELING AFRAID AS IF SOMETHING AWFUL MIGHT HAPPEN: 0
1. FEELING NERVOUS, ANXIOUS, OR ON EDGE: 0
IF YOU CHECKED OFF ANY PROBLEMS ON THIS QUESTIONNAIRE, HOW DIFFICULT HAVE THESE PROBLEMS MADE IT FOR YOU TO DO YOUR WORK, TAKE CARE OF THINGS AT HOME, OR GET ALONG WITH OTHER PEOPLE: NOT DIFFICULT AT ALL
3. WORRYING TOO MUCH ABOUT DIFFERENT THINGS: 0
4. TROUBLE RELAXING: 0
GAD7 TOTAL SCORE: 0

## 2022-06-10 ASSESSMENT — ENCOUNTER SYMPTOMS
SHORTNESS OF BREATH: 1
NAUSEA: 0
COUGH: 0
VOMITING: 0
DIARRHEA: 0

## 2022-06-10 ASSESSMENT — PATIENT HEALTH QUESTIONNAIRE - PHQ9
DEPRESSION UNABLE TO ASSESS: FUNCTIONAL CAPACITY MOTIVATION LIMITS ACCURACY
SUM OF ALL RESPONSES TO PHQ QUESTIONS 1-9: 0
SUM OF ALL RESPONSES TO PHQ QUESTIONS 1-9: 0
1. LITTLE INTEREST OR PLEASURE IN DOING THINGS: 0
SUM OF ALL RESPONSES TO PHQ9 QUESTIONS 1 & 2: 0
SUM OF ALL RESPONSES TO PHQ QUESTIONS 1-9: 0
SUM OF ALL RESPONSES TO PHQ QUESTIONS 1-9: 0
2. FEELING DOWN, DEPRESSED OR HOPELESS: 0

## 2022-06-10 NOTE — PROGRESS NOTES
6/10/2022     Chief Complaint   Patient presents with    Follow-up     Follow up on chest pain      Amy Reis (:  1935) is a 81 y.o. female, here for evaluation of the following medical concerns:    HPI    Having episodes of palpitations, chest pain and shortness of breath. Episodes are occurring once every 1 to 3 weeks. These episodes have been present for the past one year or more. The episodes seem to last for a few seconds. Has dizziness upon standing typically this is worse in the morning time. Her daughter has noticed that if she has a chest pain she will give her some Tums and that tends to improve that problem. The symptoms seem to happen both altogether at the same time and individually at other times. It she did see cardiology for these issues last year and had stress test which was normal. Blood pressure at home- usually normal, has white coat syndrome and her blood pressure is elevated today. Denies syncope, orthopnea. Has chronic ankle swelling which has not changed from baseline. This is been like this for many years. Since last office visit reports no further episodes of chest pain. Feeling well overall. We reviewed her blood work from 2022- TSH 0.13. T4 was not completed. GFR 53, h/h 9.8/28. 6. anemia stable. Review of Systems   Constitutional: Negative for chills, fatigue and fever. Respiratory: Positive for shortness of breath. Negative for cough. Cardiovascular: Positive for chest pain and palpitations. Negative for leg swelling. Gastrointestinal: Negative for diarrhea, nausea and vomiting. Neurological: Positive for dizziness. Negative for headaches. All other systems reviewed and are negative. Prior to Visit Medications    Medication Sig Taking?  Authorizing Provider   famotidine (PEPCID) 20 MG tablet Take 1 tablet by mouth 2 times daily Yes DONAVAN Vidales - CNP   metoprolol (LOPRESSOR) 100 MG tablet TAKE ONE TABLET BY MOUTH DAILY Yes [facility], DO   Multiple Vitamin (MULTIVITAMIN PO) Take by mouth Yes Historical Provider, MD   ferrous sulfate (IRON 325) 325 (65 Fe) MG tablet Take 325 mg by mouth 2 times daily (with meals) Yes Historical Provider, MD   amLODIPine (NORVASC) 5 MG tablet Take 5 mg by mouth daily Yes Historical Provider, MD   VENTOLIN  (90 Base) MCG/ACT inhaler INHALE TWO PUFFS BY MOUTH EVERY 6 HOURS AS NEEDED FOR WHEEZING Yes [facility], DO   Handicap Placard [address] [facility] by Does not apply route Yes [facility], DO   aspirin 81 MG tablet Take 81 mg by mouth daily. Yes Historical Provider, MD   vitamin D (CHOLECALCIFEROL) 1000 UNIT TABS tablet Take 1,000 Units by mouth daily. Yes Historical Provider, MD        Social History     Tobacco Use    Smoking status: Never Smoker    Smokeless tobacco: Never Used   Substance Use Topics    Alcohol use: No        Vitals:    06/10/22 1337   BP: 138/70   Site: Left Upper Arm   Position: Sitting   Pulse: 65   Resp: 16   Temp: 97.1 °F (36.2 °C)   TempSrc: Temporal   SpO2: 98%   Weight: 134 lb (60.8 kg)     Estimated body mass index is 23.74 kg/m² as calculated from the following:    Height as of 7/1/21: 5' 3\" (1.6 m). Weight as of this encounter: 134 lb (60.8 kg). Physical Exam  Vitals and nursing note reviewed. Constitutional:       General: She is not in acute distress. Appearance: Normal appearance. She is well-developed. She is not ill-appearing, toxic-appearing or diaphoretic. HENT:      Head: Normocephalic and atraumatic. Eyes:      Extraocular Movements: Extraocular movements intact. Pupils: Pupils are equal, round, and reactive to light. Neck:      Thyroid: No thyroid mass, thyromegaly or thyroid tenderness. Vascular: No carotid bruit. Cardiovascular:      Rate and Rhythm: Normal rate and regular rhythm. Heart sounds: Normal heart sounds, S1 normal and S2 normal. No murmur heard. No friction rub. No gallop.     Pulmonary:      Effort: Pulmonary effort is normal. No respiratory distress. Breath sounds: Normal breath sounds. Musculoskeletal:      Cervical back: Normal range of motion and neck supple. No rigidity or tenderness. Lymphadenopathy:      Cervical: No cervical adenopathy. Neurological:      General: No focal deficit present. Mental Status: She is alert and oriented to person, place, and time. Mental status is at baseline. Cranial Nerves: No cranial nerve deficit. Psychiatric:         Speech: Speech normal.         ASSESSMENT/PLAN:  1. Low TSH level  - T3; Future    2. Palpitations    3. Dizziness    4. Other chest pain    - will check further thyroid labs- TSH, free t4, free t3 and t3  - discussed hyperthyroid could be contributing to all of her symptoms  - if TSH remains low will obtain will get thyroid scan  - has appointment with cardiology 7/1/22  - Currently wearing holter monitor    An electronic signature was used to authenticate this note.     --DONAVAN Cohen - CNP on 6/10/2022 at 3:32 PM

## 2022-06-10 NOTE — PROGRESS NOTES
Monitor placed by [address] [place] [address] [employer]  Length of monitor 14 days  Monitor ordered by Candance Pol PCP  Kit ID [id number]  Activation successful prior to pt leaving office?  Yes

## 2022-06-11 LAB
T3 FREE: 2.4 PG/ML (ref 2.3–4.2)
T3 TOTAL: 0.8 NG/ML (ref 0.8–2)
T4 FREE: 1.2 NG/DL (ref 0.9–1.8)
TSH REFLEX: 0.29 UIU/ML (ref 0.27–4.2)

## 2022-06-16 ENCOUNTER — TELEPHONE (OUTPATIENT)
Dept: FAMILY MEDICINE CLINIC | Age: 87
End: 2022-06-16

## 2022-07-06 ENCOUNTER — APPOINTMENT (OUTPATIENT)
Dept: CT IMAGING | Age: 87
End: 2022-07-06
Payer: MEDICARE

## 2022-07-06 ENCOUNTER — APPOINTMENT (OUTPATIENT)
Dept: ULTRASOUND IMAGING | Age: 87
End: 2022-07-06
Payer: MEDICARE

## 2022-07-06 ENCOUNTER — HOSPITAL ENCOUNTER (EMERGENCY)
Age: 87
Discharge: HOME | End: 2022-07-06
Payer: MEDICARE

## 2022-07-06 ENCOUNTER — APPOINTMENT (OUTPATIENT)
Dept: GENERAL RADIOLOGY | Age: 87
End: 2022-07-06
Payer: MEDICARE

## 2022-07-06 VITALS
TEMPERATURE: 98.9 F | OXYGEN SATURATION: 99 % | SYSTOLIC BLOOD PRESSURE: 156 MMHG | WEIGHT: 132 LBS | RESPIRATION RATE: 16 BRPM | HEART RATE: 74 BPM | HEIGHT: 62 IN | DIASTOLIC BLOOD PRESSURE: 70 MMHG | BODY MASS INDEX: 24.29 KG/M2

## 2022-07-06 DIAGNOSIS — S09.90XA INJURY OF HEAD, INITIAL ENCOUNTER: ICD-10-CM
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
## 2022-07-06 DIAGNOSIS — S40.019A CONTUSION, SHOULDER AND UPPER ARM, MULTIPLE SITES, UNSPECIFIED LATERALITY, INITIAL ENCOUNTER: ICD-10-CM

## 2022-07-06 DIAGNOSIS — E04.1 THYROID NODULE: ICD-10-CM

## 2022-07-06 DIAGNOSIS — S40.029A CONTUSION, SHOULDER AND UPPER ARM, MULTIPLE SITES, UNSPECIFIED LATERALITY, INITIAL ENCOUNTER: ICD-10-CM

## 2022-07-06 PROCEDURE — 6370000000 HC RX 637 (ALT 250 FOR IP): Performed by: PHYSICIAN ASSISTANT

## 2022-07-06 PROCEDURE — 72125 CT NECK SPINE W/O DYE: CPT

## 2022-07-06 PROCEDURE — 70450 CT HEAD/BRAIN W/O DYE: CPT

## 2022-07-06 PROCEDURE — 76536 US EXAM OF HEAD AND NECK: CPT

## 2022-07-06 PROCEDURE — 6360000002 HC RX W HCPCS: Performed by: PHYSICIAN ASSISTANT

## 2022-07-06 PROCEDURE — 99284 EMERGENCY DEPT VISIT MOD MDM: CPT

## 2022-07-06 PROCEDURE — 84443 ASSAY THYROID STIM HORMONE: CPT

## 2022-07-06 PROCEDURE — 90715 TDAP VACCINE 7 YRS/> IM: CPT | Performed by: PHYSICIAN ASSISTANT

## 2022-07-06 PROCEDURE — 73060 X-RAY EXAM OF HUMERUS: CPT

## 2022-07-06 PROCEDURE — 90471 IMMUNIZATION ADMIN: CPT | Performed by: PHYSICIAN ASSISTANT

## 2022-07-06 PROCEDURE — 12001 RPR S/N/AX/GEN/TRNK 2.5CM/<: CPT

## 2022-07-06 RX ORDER — ACETAMINOPHEN 325 MG/1
650 TABLET ORAL ONCE
Status: COMPLETED | OUTPATIENT
Start: 2022-07-06 | End: 2022-07-06

## 2022-07-06 RX ADMIN — ACETAMINOPHEN 650 MG: 325 TABLET ORAL at 11:46

## 2022-07-06 RX ADMIN — TETANUS TOXOID, REDUCED DIPHTHERIA TOXOID AND ACELLULAR PERTUSSIS VACCINE, ADSORBED 0.5 ML: 5; 2.5; 8; 8; 2.5 SUSPENSION INTRAMUSCULAR at 11:46

## 2022-07-06 ASSESSMENT — ENCOUNTER SYMPTOMS
ABDOMINAL PAIN: 0
NAUSEA: 0
CHEST TIGHTNESS: 0
DIARRHEA: 0
VOMITING: 0
EYE REDNESS: 0
CONSTIPATION: 0
SINUS PRESSURE: 0
SORE THROAT: 0
COUGH: 0
RHINORRHEA: 0
SINUS PAIN: 0
EYE DISCHARGE: 0
SHORTNESS OF BREATH: 0

## 2022-07-06 ASSESSMENT — PAIN SCALES - GENERAL
PAINLEVEL_OUTOF10: 5
PAINLEVEL_OUTOF10: 0
PAINLEVEL_OUTOF10: 0

## 2022-07-06 ASSESSMENT — PAIN - FUNCTIONAL ASSESSMENT
PAIN_FUNCTIONAL_ASSESSMENT: 0-10
PAIN_FUNCTIONAL_ASSESSMENT: NONE - DENIES PAIN

## 2022-07-06 ASSESSMENT — PAIN DESCRIPTION - LOCATION: LOCATION: ARM

## 2022-07-06 ASSESSMENT — PAIN DESCRIPTION - DESCRIPTORS: DESCRIPTORS: ACHING

## 2022-07-06 ASSESSMENT — PAIN DESCRIPTION - ORIENTATION: ORIENTATION: RIGHT

## 2022-07-06 NOTE — ED PROVIDER NOTES
[facility] Brewer  ED  EMERGENCY DEPARTMENT ENCOUNTER        Pt Name: Jadon Santiago  MRN: [id number]  Natasha 1935  Date of evaluation: 7/6/2022  Provider: Ho Gaming PA-C  PCP: Zo Brewer DO  ED Attending: No att. providers found      This patient was not seen and evaluated by the attending physician No att. providers found. I have independently evaluated this patient. CHIEF COMPLAINT       Chief Complaint   Patient presents with    Fall     pt was cleaning out her garbage can with a hose when the lid came back striking her causing her to fall backward hitting her head. pt c/o bilateral arm abrasions, right arm pain, and reports \"I almost passed out\"     Arm Pain       HISTORY OF PRESENT ILLNESS   (Location/Symptom, Timing/Onset, Context/Setting, Quality, Duration, Modifying Factors, Severity)  Note limiting factors. Jadon Santiago is a 81 y.o. female with a history of asthma, HLD, arthritis, HTN presents for evaluation after she was at home, cleaning out her large garbage can outside with a hose. She advised that the lid became down on her head and she fell. She hit her head, and both upper arms. Patient also complains of neck pain. Pain worse in right UE than left UE. Patient is left-hand dominant. Uncertain last tetanus. Patient is taking a daily aspirin no other anticoagulation. Patient denies any chest pain or shortness of breath. Denies any presyncope weakness numbness or tingling. No treatment prior to arrival.   Patient's children live with her. And her grandson. Patient does not typically use any ambulation aids she advised she is very active. Works in yard. Nursing Notes were all reviewed and agreed with or any disagreements were addressed  in the HPI. REVIEW OF SYSTEMS  (2-9 systems for level 4, 10 or more for level 5)     Review of Systems   Constitutional: Negative for chills and fever. HENT: Negative.   Negative for congestion, rhinorrhea, sinus pressure, sinus pain and sore throat. Eyes: Negative for discharge, redness and visual disturbance. Respiratory: Negative for cough, chest tightness and shortness of breath. Cardiovascular: Negative for chest pain and palpitations. Gastrointestinal: Negative for abdominal pain, constipation, diarrhea, nausea and vomiting. Genitourinary: Negative for difficulty urinating, dysuria and frequency. Musculoskeletal: Positive for arthralgias and myalgias. Skin: Positive for wound. Neurological: Negative. Negative for dizziness, weakness, numbness and headaches. Psychiatric/Behavioral: Negative. All other systems reviewed and are negative. Positivesand Pertinent negatives as per HPI. Except as noted above in the ROS, all other systems were reviewed and negative.        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    Mild intermittent asthma 1/14/2020         SURGICAL HISTORY       Past Surgical History:   Procedure Laterality Date    KNEE SURGERY           CURRENT MEDICATIONS       Discharge Medication List as of 7/6/2022  4:00 PM      CONTINUE these medications which have NOT CHANGED    Details   famotidine (PEPCID) 20 MG tablet Take 1 tablet by mouth 2 times daily, Disp-60 tablet, R-0Normal      metoprolol (LOPRESSOR) 100 MG tablet TAKE ONE TABLET BY MOUTH DAILY, Disp-30 tablet, R-1Normal      Multiple Vitamin (MULTIVITAMIN PO) Take by mouthHistorical Med      ferrous sulfate (IRON 325) 325 (65 Fe) MG tablet Take 325 mg by mouth 2 times daily (with meals)Historical Med      amLODIPine (NORVASC) 5 MG tablet Take 5 mg by mouth dailyHistorical Med      VENTOLIN  (90 Base) MCG/ACT inhaler INHALE TWO PUFFS BY MOUTH EVERY 6 HOURS AS NEEDED FOR WHEEZING, Disp-18 g, R-2Normal      Handicap Placard MISC Starting Tue 1/14/2020, Disp-1 each, R-0, Print aspirin 81 MG tablet Take 81 mg by mouth daily. vitamin D (CHOLECALCIFEROL) 1000 UNIT TABS tablet Take 1,000 Units by mouth daily. ALLERGIES     Sulfa antibiotics    FAMILY HISTORY     History reviewed. No pertinent family history. SOCIAL HISTORY       Social History     Socioeconomic History    Marital status: Single     Spouse name: None    Number of children: None    Years of education: None    Highest education level: None   Occupational History    None   Tobacco Use    Smoking status: Never Smoker    Smokeless tobacco: Never Used   Substance and Sexual Activity    Alcohol use: No    Drug use: No    Sexual activity: None   Other Topics Concern    None   Social History Narrative    None     Social Determinants of Health     Financial Resource Strain: Low Risk     Difficulty of Paying Living Expenses: Not very hard   Food Insecurity: No Food Insecurity    Worried About Running Out of Food in the Last Year: Never true    Sandra of Food in the Last Year: Never true   Transportation Needs:     Lack of Transportation (Medical): Not on file    Lack of Transportation (Non-Medical):  Not on file   Physical Activity:     Days of Exercise per Week: Not on file    Minutes of Exercise per Session: Not on file   Stress:     Feeling of Stress : Not on file   Social Connections:     Frequency of Communication with Friends and Family: Not on file    Frequency of Social Gatherings with Friends and Family: Not on file    Attends Roman [employer] Services: Not on file    Active Member of Clubs or Organizations: Not on file    Attends Club or Organization Meetings: Not on file    Marital Status: Not on file   Intimate Partner Violence:     Fear of Current or Ex-Partner: Not on file    Emotionally Abused: Not on file    Physically Abused: Not on file    Sexually Abused: Not on file   Housing Stability:     Unable to Pay for Housing in the Last Year: Not on file    Number of Places Lived in the Last Year: Not on file    Unstable Housing in the Last Year: Not on file       SCREENINGS     NIH Score       [facility]     Heart Score         PHYSICAL EXAM    (up to 7 for level 4, 8 ormore for level 5)     ED Triage Vitals [07/06/22 1121]   BP Temp Temp Source Heart Rate Resp SpO2 Height Weight   (!) 182/77 98.9 °F (37.2 °C) Oral 81 19 99 % 5' 2\" (1.575 m) 132 lb (59.9 kg)       Physical Exam  Vitals and nursing note reviewed. Constitutional:       Appearance: She is well-developed. She is not diaphoretic. HENT:      Head: Normocephalic. Comments: +central forehead, hematoma formation     Nose: Nose normal.      Mouth/Throat:      Pharynx: No oropharyngeal exudate. Eyes:      General:         Right eye: No discharge. Left eye: No discharge. Conjunctiva/sclera: Conjunctivae normal.      Pupils: Pupils are equal, round, and reactive to light. Cardiovascular:      Rate and Rhythm: Normal rate and regular rhythm. Heart sounds: Normal heart sounds. No murmur heard. No friction rub. No gallop. Pulmonary:      Effort: Pulmonary effort is normal. No respiratory distress. Breath sounds: Normal breath sounds. No wheezing. Abdominal:      General: Bowel sounds are normal. There is no distension. Palpations: Abdomen is soft. Tenderness: There is no abdominal tenderness. Musculoskeletal:         General: Normal range of motion. Cervical back: Normal range of motion. Skin:     General: Skin is warm and dry. Capillary Refill: Capillary refill takes less than 2 seconds. Comments: +RUE, Upper arm, large hematoma formation  +LUE, upper arm, hematoma formation  No bony tenderness normal ROM, NV intact, SILT. No tenderness to wrist or elbow. +diffuse, paraspinal cervical spine tenderness no midline tenderness. No bony instability or step-off. Neurological:      General: No focal deficit present.       Mental Status: She is alert and oriented to person, place, and time. Mental status is at baseline. Psychiatric:         Mood and Affect: Mood normal.         Behavior: Behavior normal.           DIAGNOSTIC RESULTS   LABS:    Labs Reviewed   TSH WITH REFLEX TO FT4       All other labs were within normal range or notreturned as of this dictation. EKG: All EKG's are interpreted by the Emergency Department Physician who either signs or Co-signs this chart in the absence of a cardiologist.  Please see their note for interpretation of EKG. RADIOLOGY:         Interpretation per the Radiologist below, if available at the time of this note:    US THYROID   Final Result   Multinodular thyroid gland. 2 solid TR 3 nodules on the right measuring   maximally 16 and 15 mm. Single solid 19 mm TR 5 nodule on the left. Per ACR   criteria, consider FNA of the left thyroid nodule. Continued imaging   surveillance of the right thyroid nodules with follow-up study in 1 year is   recommended. RECOMMENDATIONS:   NODULE Right 1: ACR TI-RADS TR3:      Recommend: Follow-up ultrasound in 1 year. NODULE Right 2: ACR TI-RADS TR3:      Recommend: Follow-up ultrasound in 1 year. NODULE Left 1: ACR TI-RADS TR5:      Recommend:  Ultrasound-guided fine needle aspiration. ACR TI-RADS recommendations:      TR5 (>= 7 points):  FNA if >= 1 cm; follow-up if 0.5-0.9 cm in 1, 2, 3, 4,   and 5 years      TR4 (4-6 points):  FNA if >= 1.5 cm; follow-up if 1.0-1.4 cm in 1, 2, 3, and   5 years      TR3 (3 points):  FNA if >= 2.5 cm; follow-up if 1.5-2.4 cm in 1, 3, and 5   years      TR2 (2 points):  No FNA or follow-up      TR1 (0 points):  No FNA or follow-up      ACR TI-RADS recommends that no more than two nodules with the highest ACR   TI-RADS point total should be biopsied and no more than four nodules should   be followed. CT Head WO Contrast   Final Result   No acute intracranial abnormality.          CT CERVICAL SPINE WO CONTRAST Final Result   No acute abnormality of the cervical spine. Multilevel degenerative disc disease and facet arthropathy. Thyromegaly. Thyroid nodules measuring up to 1.6 cm on the right. Recommend   ultrasound follow-up if clinically indicated. RECOMMENDATIONS:   1.6 cm incidental right thyroid nodule with heterogeneous and enlarged   thyroid. Recommend thyroid US if clinically warranted given patient age. Reference: J Am Madiha Radiol. 2015 Feb;12(2): 143-50         XR HUMERUS LEFT (MIN 2 VIEWS)   Final Result   No acute osseous abnormality. XR HUMERUS RIGHT (MIN 2 VIEWS)   Final Result   No acute osseous abnormality. No results found. PROCEDURES   PROCEDURE:  LACERATION REPAIR  Sloan Tran or their surrogate had an opportunity to ask questions, and the risks, benefits, and alternatives were discussed. The wound was prepped and draped to maintain a sterile field. A local anesthetic was not required. It was copiously irrigated. It was explored to its depth in a bloodless field with no sign of tendon, nerve, or vascular injury. No foreign bodies were identified. It was closed with tissue adhesive. There were no complications during the procedure. Is this patient to be included in the SEP-1 Core Measure due to severe sepsis or septic shock?    No   Exclusion criteria - the patient is NOT to be included for SEP-1 Core Measure due to:  2+ SIRS criteria are not met     CONSULTS:  None      EMERGENCY DEPARTMENT COURSE and DIFFERENTIAL DIAGNOSIS/MDM:   Vitals:    Vitals:    07/06/22 1121 07/06/22 1615   BP: (!) 182/77 (!) 156/70   Pulse: 81 74   Resp: 19 16   Temp: 98.9 °F (37.2 °C)    TempSrc: Oral    SpO2: 99% 99%   Weight: 132 lb (59.9 kg)    Height: 5' 2\" (1.575 m)        Patient was given the following medications:  Medications   tetanus-diphth-acell pertussis (BOOSTRIX) injection 0.5 mL (0.5 mLs IntraMUSCular Given 7/6/22 1146)   acetaminophen (TYLENOL) tablet 650 mg (650 mg Oral Given 7/6/22 1146)         Afebrile, stable, patient presents to the ED for evaluation. Nontoxic patient in no acute distress presents after a fall at home. CT of the head and cervical spine are obtained. Incidental findings of a nodule to patient's thyroid and thyromegaly. X-rays to bilateral upper arms where patient has injuries occurred. Patient's wound to her right upper extremity is cleaned copiously irrigated and repaired with tissue adhesive without complication. Ordered ultrasound of patient's thyroid to be completed as an outpatient in addition to thyroid labs which patient is to follow-up with her primary care provider Dr. Be July however patient was taken for inpatient ultrasound of thyroid. Which returns revealing a multinodular thyroid gland 2 nodules on the right and a single nodule on the left recommendation would be to perform fine-needle aspiration. Discussed at extensive length the importance of close follow-up with PCP patient and her daughter who is at bedside who verbalized understanding. ED Course as of 07/08/22 1859   Wed Jul 06, 2022   1449 Back in the room to reevaluate the patient who advised that she is feeling better. After receiving Tylenol. Discussed incidental findings of thyroid nodule and thyromegaly with patient and her daughter who are at bedside they are very concerned about these findings. Advised in the past she has had abnormal thyroid labs. I advised we will order labs but we will not build to get them back before she leaves the emergency room today she will need to follow-up with her primary care provider on these labs. We will also place an order for an outpatient thyroid scan for patient to get completed to see Dr. Be July for close follow-up. [AR]      ED Course User Index  [AR] Tracy Mack PA-C     All questions are answered. Indications for return to the ED are discussed.   Patient is advised if any new or worsening symptoms arise they should immediately return to the emergency room. Follow-up with primary care in 1-2 days. The patient tolerated their visit well. They were seen and evaluated by the attendingphysician, No att. providers found who agreed with the assessment and plan. The patient and / or the family were informed of the results of any tests, a time was given to answer questions, a plan was proposed and they agreed Wilbur Santoyo. Results for orders placed or performed during the hospital encounter of 07/06/22   TSH with Reflex to FT4   Result Value Ref Range    TSH Reflex FT4 0.64 0.27 - 4.20 uIU/mL       I estimate there is LOW risk for ABDOMINAL AORTIC ANEURYSM, CAUDA EQUINA or CENTRAL CORD SYNDROME, COMPARTMENT SYNDROME, EPIDURAL MASS LESION, HERNIATED DISK CAUSING SEVERE STENOSIS, INTRACRANIAL HEMORRHAGE, INTRA-ABDOMINAL INJURY, PERFORATED BOWEL, SUBDURAL HEMATOMA, TENDON or NEUROVASCULAR INJURY, or a THORACIC AORTIC DISSECTION, thus I consider the discharge disposition reasonable. Also, there is no evidence or peritonitis, sepsis, or toxicity. Jamie Diego and I have discussed the diagnosis and risks, and we agree with discharging home to follow-up with their primary doctor. We also discussed returning to the Emergency Department immediately if new or worsening symptoms occur. We have discussed the symptoms which are most concerning (e.g., bloody stool, fever, changing or worsening pain, vomiting) that necessitate immediate return. FINAL IMPRESSION      1. Fall, initial encounter    2. Injury of head, initial encounter    3. Contusion, shoulder and upper arm, multiple sites, unspecified laterality, initial encounter    4.  Thyroid nodule          DISPOSITION/PLAN   DISPOSITION Decision To Discharge 07/06/2022 03:16:13 PM      PATIENT REFERRED TO:  Laverne Lomeli [address] [address]  [phone]    Schedule an appointment as soon as possible for a visit   for a recheck in 1-2  days      DISCHARGE MEDICATIONS:  Discharge Medication List as of 7/6/2022  4:00 PM          DISCONTINUED MEDICATIONS:  Discharge Medication List as of 7/6/2022  4:00 PM                 Pt was seen during the COVID 19 pandemic. Appropriate PPE worn by ME during patient encounters. Pt seen during a time with constrained hospital bed capacity and other potential inpatient and outpatient resources were constrained due to the viral pandemic.    Please note that portions of this note were completed with a voice recognition program.  Efforts were made to edit the dictations but occasionally words are mis-transcribed.)    Elsi Osborn PA-C (electronically signed)        Elsi Osborn PA-C  07/08/22 [id number]

## 2022-07-07 LAB — TSH REFLEX FT4: 0.64 UIU/ML (ref 0.27–4.2)

## 2022-07-07 NOTE — PROGRESS NOTES
7/8/2022    This is a 81 y.o. female   Chief Complaint   Patient presents with   [address] ER, Fall, hit head, both arms bruised,     Thyroid Problem     nodules on thyroid    . HPI  Patient presents today for follow-up for visit to [facility] ED on July 6, 2022. Patient had been cleaning out her garbage can with the house when the lid struck her causing her to fall backwards. She hit her head and was complaining of bilateral arm abrasions right arm pain and near syncope. X-rays of both humerus   were negative as well as CT of the head showed no acute abnormality. CT of the cervical spine showed no abnormalities but there were thyroid nodules noted measuring up to 1.6 cm on the right and an ultrasound was recommended. Thyroid US was done and showed 2 solid nodules on the right measuring max and minimally at 16 and 15 mm and a single solid 19 mm nodule on the left. An FNA and a was recommended for the left thyroid nodule as well as continuing surveillance of the right thyroid nodules with a follow-up in 1 year. Pt's daughter present today, states that pt     States that she feels good, denies LOC at time of accident, denies HA's. Had recent thyroid labs that are wnl, daughter states pt's hands shake. Sx present for 3-4 months. Sx occur when picking things up, also having SOB and feeling like her heart is racing. Recently wore heart monitor and results are not back yet. Denies hx of smoking or COPD, denies recent SOB or tremors in the last 2 weeks. .   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    Mild intermittent asthma 1/14/2020       Past Surgical History:   Procedure Laterality Date    KNEE SURGERY         Social History     Socioeconomic History    Marital status: Single     Spouse name: Not on file    Number of children: Not on file    Years of education: Not on file    Highest education level: Not on file   Occupational History    Not on file   Tobacco Use    Smoking status: Never Smoker    Smokeless tobacco: Never Used   Substance and Sexual Activity    Alcohol use: No    Drug use: No    Sexual activity: Not on file   Other Topics Concern    Not on file   Social History Narrative    Not on file     Social Determinants of Health     Financial Resource Strain: Low Risk     Difficulty of Paying Living Expenses: Not very hard   Food Insecurity: No Food Insecurity    Worried About Running Out of Food in the Last Year: Never true    Sandra of Food in the Last Year: Never true   Transportation Needs:     Lack of Transportation (Medical): Not on file    Lack of Transportation (Non-Medical): Not on file   Physical Activity:     Days of Exercise per Week: Not on file    Minutes of Exercise per Session: Not on file   Stress:     Feeling of Stress : Not on file   Social Connections:     Frequency of Communication with Friends and Family: Not on file    Frequency of Social Gatherings with Friends and Family: Not on file    Attends Scientologist Services: Not on file    Active Member of [employer] or Organizations: Not on file    Attends Club or Organization Meetings: Not on file    Marital Status: Not on file   Intimate Partner Violence:     Fear of Current or Ex-Partner: Not on file    Emotionally Abused: Not on file    Physically Abused: Not on file    Sexually Abused: Not on file   Housing Stability:     Unable to Pay for Housing in the Last Year: Not on file    Number of [place] in the Last Year: Not on file    Unstable Housing in the Last Year: Not on file       History reviewed. No pertinent family history.     Current Outpatient Medications   Medication Sig Dispense Refill    IBUPROFEN PO Take by mouth      alendronate (FOSAMAX) 70 MG tablet Alendronate Oral (Weekly)        active      cyanocobalamin 1000 MCG tablet Cyanocobalamin Oral active      hydrocortisone (HYTONE) 2.5 % lotion Hydrocortisone Topical Lotion 2.5 %        active      famotidine (PEPCID) 20 MG tablet Take 1 tablet by mouth 2 times daily 60 tablet 0    metoprolol (LOPRESSOR) 100 MG tablet TAKE ONE TABLET BY MOUTH DAILY 30 tablet 1    Multiple Vitamin (MULTIVITAMIN PO) Take by mouth      ferrous sulfate (IRON 325) 325 (65 Fe) MG tablet Take 325 mg by mouth 2 times daily (with meals)      amLODIPine (NORVASC) 5 MG tablet Take 5 mg by mouth daily      VENTOLIN  (90 Base) MCG/ACT inhaler INHALE TWO PUFFS BY MOUTH EVERY 6 HOURS AS NEEDED FOR WHEEZING 18 g 2    Handicap Placard MISC by Does not apply route 1 each 0    aspirin 81 MG tablet Take 81 mg by mouth daily.  vitamin D (CHOLECALCIFEROL) 1000 UNIT TABS tablet Take 1,000 Units by mouth daily. No current facility-administered medications for this visit. Immunization History   Administered Date(s) Administered    Influenza, High Dose (Fluzone 65 yrs and older) 10/20/2017, 09/05/2018, 09/10/2019    Pneumococcal Conjugate 13-valent ([id number]) 11/21/2017    Pneumococcal Polysaccharide ([id number]) 07/25/2018    Tdap (Boostrix, Adacel) 05/06/2015, 07/06/2022    Zoster Live (Zostavax) 05/10/2015       Allergies   Allergen Reactions    Sulfa Antibiotics Swelling       Admission on 07/06/2022, Discharged on 07/06/2022   Component Date Value Ref Range Status    TSH Reflex FT4 07/06/2022 0.64  0.27 - 4.20 uIU/mL Final       Review of Systems   HENT:        Positive for thyroid nodules   Skin: Positive for wound. Neurological: Positive for tremors. Pulse 74   Temp 96.9 °F (36.1 °C) (Temporal)   Resp 16   Wt 135 lb 9.6 oz (61.5 kg)   SpO2 99%   BMI 24.80 kg/m²     Physical Exam  Vitals reviewed. Constitutional:       Appearance: She is well-developed. HENT:      Head: Normocephalic and atraumatic. Eyes:      Pupils: Pupils are equal, round, and reactive to light.    Cardiovascular: Rate and Rhythm: Normal rate and regular rhythm. Heart sounds: Normal heart sounds. No murmur heard. Pulmonary:      Effort: Pulmonary effort is normal.      Breath sounds: Normal breath sounds. No wheezing. Abdominal:      General: Bowel sounds are normal.      Tenderness: There is no abdominal tenderness. Musculoskeletal:      Cervical back: Normal range of motion. Skin:     Findings: Bruising (bilateral proximal UE's ,1.0 cm healing laceration on left UE, approx 5.0 cm healing lacertion on right UE with sign of infection, does have vey small amount of clear discharge) present. Neurological:      Mental Status: She is alert and oriented to person, place, and time. Psychiatric:         Mood and Affect: Mood normal.         Behavior: Behavior normal.         Thought Content: Thought content normal.         Judgment: Judgment normal.         Plan   Diagnosis Orders   1. Left thyroid nodule  [facility]ol[facility]   2. Multiple thyroid nodules  US THYROID   3. Fall, initial encounter  Overall doing well   4. Laceration of left upper arm, initial encounter  Pt and daughter to continue monitoring and applying Polysporin BID, all office if any warmth, fever, or pus discharge   5. Laceration of right upper arm, initial encounter  Pt and daughter to continue monitoring and applying Polysporin BID     Follow-up if no improvement. Prior to Visit Medications    Medication Sig Taking?  Authorizing Provider   IBUPROFEN PO Take by mouth Yes Historical Provider, MD   alendronate (FOSAMAX) 70 MG tablet Alendronate Oral (Weekly)        active Yes Historical Provider, MD   cyanocobalamin 1000 MCG tablet Cyanocobalamin Oral        active Yes Historical Provider, MD   hydrocortisone (HYTONE) 2.5 % lotion Hydrocortisone Topical Lotion 2.5 %        active Yes Historical Provider, MD   famotidine (PEPCID) 20 MG tablet Take 1 tablet by mouth 2 times daily Yes Edgar Beatty Eric Chavarria, APRN - CNP   metoprolol (LOPRESSOR) 100 MG tablet TAKE ONE TABLET BY MOUTH DAILY Yes [facility], DO   Multiple Vitamin (MULTIVITAMIN PO) Take by mouth Yes Historical Provider, MD   ferrous sulfate (IRON 325) 325 (65 Fe) MG tablet Take 325 mg by mouth 2 times daily (with meals) Yes Historical Provider, MD   amLODIPine (NORVASC) 5 MG tablet Take 5 mg by mouth daily Yes Historical Provider, MD   VENTOLIN  (90 Base) MCG/ACT inhaler INHALE TWO PUFFS BY MOUTH EVERY 6 HOURS AS NEEDED FOR WHEEZING Yes [facility], DO   Handicap Placard [address] [facility] by Does not apply route Yes [facility], DO   aspirin 81 MG tablet Take 81 mg by mouth daily. Yes Historical Provider, MD   vitamin D (CHOLECALCIFEROL) 1000 UNIT TABS tablet Take 1,000 Units by mouth daily.  Yes Historical Provider, MD

## 2022-07-08 ENCOUNTER — OFFICE VISIT (OUTPATIENT)
Dept: FAMILY MEDICINE CLINIC | Age: 87
End: 2022-07-08
Payer: MEDICARE

## 2022-07-08 VITALS
RESPIRATION RATE: 16 BRPM | OXYGEN SATURATION: 99 % | BODY MASS INDEX: 24.8 KG/M2 | TEMPERATURE: 96.9 F | HEART RATE: 74 BPM | WEIGHT: 135.6 LBS

## 2022-07-08 DIAGNOSIS — E04.1 LEFT THYROID NODULE: Primary | ICD-10-CM

## 2022-07-08 DIAGNOSIS — S41.111A LACERATION OF RIGHT UPPER ARM, INITIAL ENCOUNTER: ICD-10-CM
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
## 2022-07-08 DIAGNOSIS — S41.112A LACERATION OF LEFT UPPER ARM, INITIAL ENCOUNTER: ICD-10-CM

## 2022-07-08 DIAGNOSIS — E04.2 MULTIPLE THYROID NODULES: ICD-10-CM

## 2022-07-08 PROCEDURE — 99213 OFFICE O/P EST LOW 20 MIN: CPT | Performed by: FAMILY MEDICINE

## 2022-07-08 PROCEDURE — 1123F ACP DISCUSS/DSCN MKR DOCD: CPT | Performed by: FAMILY MEDICINE

## 2022-07-08 RX ORDER — ALENDRONATE SODIUM 70 MG/1
TABLET ORAL
COMMUNITY

## 2022-07-08 RX ORDER — HYDROCORTISONE 25 MG/ML
LOTION TOPICAL
COMMUNITY

## 2022-07-08 SDOH — ECONOMIC STABILITY: FOOD INSECURITY: WITHIN THE PAST 12 MONTHS, YOU WORRIED THAT YOUR FOOD WOULD RUN OUT BEFORE YOU GOT MONEY TO BUY MORE.: NEVER TRUE

## 2022-07-08 SDOH — ECONOMIC STABILITY: FOOD INSECURITY: WITHIN THE PAST 12 MONTHS, THE FOOD YOU BOUGHT JUST DIDN'T LAST AND YOU DIDN'T HAVE MONEY TO GET MORE.: NEVER TRUE

## 2022-07-08 ASSESSMENT — PATIENT HEALTH QUESTIONNAIRE - PHQ9
1. LITTLE INTEREST OR PLEASURE IN DOING THINGS: 0
2. FEELING DOWN, DEPRESSED OR HOPELESS: 0
SUM OF ALL RESPONSES TO PHQ QUESTIONS 1-9: 0
SUM OF ALL RESPONSES TO PHQ9 QUESTIONS 1 & 2: 0
SUM OF ALL RESPONSES TO PHQ QUESTIONS 1-9: 0

## 2022-07-08 ASSESSMENT — SOCIAL DETERMINANTS OF HEALTH (SDOH): HOW HARD IS IT FOR YOU TO PAY FOR THE VERY BASICS LIKE FOOD, HOUSING, MEDICAL CARE, AND HEATING?: NOT VERY HARD

## 2022-07-14 DIAGNOSIS — R00.2 PALPITATION: ICD-10-CM

## 2022-07-15 ENCOUNTER — OFFICE VISIT (OUTPATIENT)
Dept: ENT CLINIC | Age: 87
End: 2022-07-15
Payer: MEDICARE

## 2022-07-15 VITALS
SYSTOLIC BLOOD PRESSURE: 169 MMHG | BODY MASS INDEX: 24.84 KG/M2 | WEIGHT: 135 LBS | HEART RATE: 67 BPM | HEIGHT: 62 IN | DIASTOLIC BLOOD PRESSURE: 79 MMHG

## 2022-07-15 DIAGNOSIS — E04.2 MULTINODULAR GOITER: Primary | ICD-10-CM

## 2022-07-15 PROCEDURE — 1123F ACP DISCUSS/DSCN MKR DOCD: CPT | Performed by: OTOLARYNGOLOGY

## 2022-07-15 PROCEDURE — 99203 OFFICE O/P NEW LOW 30 MIN: CPT | Performed by: OTOLARYNGOLOGY

## 2022-07-15 ASSESSMENT — ENCOUNTER SYMPTOMS
CONSTIPATION: 0
COUGH: 0
EYE ITCHING: 0
COLOR CHANGE: 0
TROUBLE SWALLOWING: 0
DIARRHEA: 0
EYE DISCHARGE: 0
FACIAL SWELLING: 0
BACK PAIN: 0
SORE THROAT: 0
WHEEZING: 0
SINUS PAIN: 0
VOMITING: 0
VOICE CHANGE: 0
PHOTOPHOBIA: 0
RHINORRHEA: 0
SHORTNESS OF BREATH: 0
BLOOD IN STOOL: 0
CHOKING: 0
NAUSEA: 0
SINUS PRESSURE: 0
STRIDOR: 0

## 2022-07-15 NOTE — PROGRESS NOTES
[facility]  [address], [address] [facility], [address] [place] [address] Ave  P: [phone]  F: [phone]       Patient     Savannah Merida  1935    ChiefComplaint     Chief Complaint   Patient presents with    New Patient     Patient is here today because she has polyps on her left thyroid       History of Present Illness     Savannah Merida is a pleasant 81 y.o. female who presents as a new consultation referred by her primary care physician Dr. Ismael Curtis for multinodular goiter. Patient suffered a recent fall and underwent a CT scan which revealed some thyroid nodules. Subsequent thyroid ultrasound was performed. Ultrasound report reveals 2 right-sided nodules and 1 left-sided nodule. Right-sided nodule measures 16 x 9 x 18 mm and 15 x 8 x 9 mm. Left sided nodule measures 19 x 13 x 12 mm. Patient denies any family history of thyroid cancer. Denies any compressive symptoms of the neck. Denies history of radiation exposure to the head or neck. 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     Social History     Social History     Socioeconomic History    Marital status: Single     Spouse name: Not on file    Number of children: Not on file    Years of education: Not on file    Highest education level: Not on file   Occupational History    Not on file   Tobacco Use    Smoking status: Never    Smokeless tobacco: Never   Substance and Sexual Activity    Alcohol use: No    Drug use: No    Sexual activity: Not on file   Other Topics Concern    Not on file   Social History Narrative    Not on file     Social Determinants of Health     Financial Resource Strain: Low Risk     Difficulty of Paying Living Expenses: Not very hard   Food Insecurity: No Food Insecurity    Worried About Running Out of Food in the Last Year: Never true    Ran Out of Food in the Last Year: Never true   Transportation Needs: Not on file   Physical Activity: Not on file   Stress: Not on file   Social Connections: Not on file   Intimate Partner Violence: Not on file   Housing Stability: Not on file       Allergies     Allergies   Allergen Reactions    Sulfa Antibiotics Swelling       Medications     Current Outpatient Medications   Medication Sig Dispense Refill    IBUPROFEN PO Take by mouth      alendronate (FOSAMAX) 70 MG tablet Alendronate Oral (Weekly)        active      cyanocobalamin 1000 MCG tablet Cyanocobalamin Oral        active      hydrocortisone (HYTONE) 2.5 % lotion Hydrocortisone Topical Lotion 2.5 %        active      famotidine (PEPCID) 20 MG tablet Take 1 tablet by mouth 2 times daily 60 tablet 0    metoprolol (LOPRESSOR) 100 MG tablet TAKE ONE TABLET BY MOUTH DAILY 30 tablet 1    Multiple Vitamin (MULTIVITAMIN PO) Take by mouth      ferrous sulfate (IRON 325) 325 (65 Fe) MG tablet Take 325 mg by mouth 2 times daily (with meals)      amLODIPine (NORVASC) 5 MG tablet Take 5 mg by mouth daily      VENTOLIN  (90 Base) MCG/ACT inhaler INHALE TWO PUFFS BY MOUTH EVERY 6 HOURS AS NEEDED FOR WHEEZING 18 g 2    Handicap Placard MISC by Does not apply route 1 each 0    aspirin 81 MG tablet Take 81 mg by mouth daily. vitamin D (CHOLECALCIFEROL) 1000 UNIT TABS tablet Take 1,000 Units by mouth daily. No current facility-administered medications for this visit. Review of Systems     Review of Systems   Constitutional:  Negative for activity change, appetite change, chills, diaphoresis, fatigue, fever and unexpected weight change.    HENT:  Negative for congestion, dental problem, drooling, ear discharge, ear pain, facial swelling, hearing loss, mouth sores, nosebleeds, postnasal drip, rhinorrhea, sinus pressure, sinus pain, sneezing, sore throat, tinnitus, trouble swallowing and voice change. Eyes:  Negative for photophobia, discharge, itching and visual disturbance. Respiratory:  Negative for cough, choking, shortness of breath, wheezing and stridor. Gastrointestinal:  Negative for blood in stool, constipation, diarrhea, nausea and vomiting. Endocrine: Negative for cold intolerance, heat intolerance, polyphagia and polyuria. Musculoskeletal:  Negative for back pain, gait problem, neck pain and neck stiffness. Skin:  Negative for color change, pallor, rash and wound. Neurological:  Negative for dizziness, syncope, facial asymmetry, speech difficulty, light-headedness, numbness and headaches. Hematological:  Negative for adenopathy. Does not bruise/bleed easily. Psychiatric/Behavioral:  Negative for agitation, confusion and sleep disturbance. PhysicalExam     Vitals:    07/15/22 1124   BP: (!) 169/79   Pulse: 67       Physical Exam  Constitutional:       Appearance: She is well-developed. HENT:      Head: Normocephalic and atraumatic. Not macrocephalic and not microcephalic. No raccoon eyes, Avina's sign, abrasion, contusion, right periorbital erythema, left periorbital erythema or laceration. Hair is normal.      Jaw: No trismus. Right Ear: Hearing, tympanic membrane and external ear normal. No decreased hearing noted. No drainage, swelling or tenderness. No middle ear effusion. No mastoid tenderness. Tympanic membrane is not perforated, retracted or bulging. Tympanic membrane has normal mobility. Left Ear: Hearing, tympanic membrane and external ear normal. No decreased hearing noted. No drainage, swelling or tenderness. No middle ear effusion. No mastoid tenderness. Tympanic membrane is not perforated, retracted or bulging. Tympanic membrane has normal mobility. Nose: No nasal deformity, septal deviation, laceration, mucosal edema or rhinorrhea.       Right Sinus: No maxillary sinus tenderness or frontal sinus tenderness. Left Sinus: No maxillary sinus tenderness or frontal sinus tenderness. Mouth/Throat:      Mouth: Mucous membranes are not pale, not dry and not cyanotic. No lacerations or oral lesions. Dentition: Normal dentition. No dental caries or dental abscesses. Pharynx: Uvula midline. No oropharyngeal exudate, posterior oropharyngeal erythema or uvula swelling. Tonsils: No tonsillar abscesses. Eyes:      General: Lids are normal.         Right eye: No discharge. Left eye: No discharge. Extraocular Movements:      Right eye: Normal extraocular motion and no nystagmus. Left eye: Normal extraocular motion and no nystagmus. Conjunctiva/sclera:      Right eye: No chemosis or exudate. Left eye: No chemosis or exudate. Neck:      Thyroid: No thyroid mass or thyromegaly. Vascular: Normal carotid pulses. Trachea: No tracheal tenderness or tracheal deviation. Cardiovascular:      Rate and Rhythm: Normal rate and regular rhythm. Pulmonary:      Effort: No tachypnea, bradypnea or respiratory distress. Breath sounds: No stridor. Musculoskeletal:      Right shoulder: Normal range of motion. Cervical back: Neck supple. Lymphadenopathy:      Head:      Right side of head: No submental, submandibular, tonsillar, preauricular, posterior auricular or occipital adenopathy. Left side of head: No submental, submandibular, tonsillar, preauricular, posterior auricular or occipital adenopathy. Cervical: No cervical adenopathy. Right cervical: No superficial, deep or posterior cervical adenopathy. Left cervical: No superficial, deep or posterior cervical adenopathy. Skin:     General: Skin is warm and dry. Findings: No bruising, erythema, laceration, lesion or rash. Neurological:      Mental Status: She is alert and oriented to person, place, and time.       Cranial Nerves: No cranial nerve deficit. Psychiatric:         Speech: Speech normal.         Behavior: Behavior normal.         Procedure           Assessment and Plan     1. Multinodular goiter  Patient has a multinodular goiter with 3 nodules, largest measuring 19 x 13 x 12 mm on the left side. None of these nodules have never been biopsied. CHIKA recommendations would be to biopsy the left and dominant right thyroid nodules. However, given the patient's age and low risk factors I think repeating ultrasound in 6 to 12 months is a reasonable approach. Discussed risks of observation versus biopsy at this time. Discussed risks of potentially malignant thyroid nodules and greater than 99% 10-year survival rate for well differentiated thyroid cancer. Patient and family members agree that proceeding with observation with a repeat ultrasound is a reasonable approach. I support this decision. If the patient does notice any change in symptoms sooner she may call. They will schedule ultrasound for 6 to 9 months from now and follow-up with me in about 9 months. Return in about 9 months (around 4/15/2023). Portions of this note were dictated using Dragon.  There may be linguistic errors secondary to the use of this program.

## 2022-07-19 RX ORDER — METOPROLOL TARTRATE 100 MG/1
TABLET ORAL
Qty: 30 TABLET | Refills: 1 | Status: SHIPPED | OUTPATIENT
Start: 2022-07-19 | End: 2022-10-05

## 2022-07-19 NOTE — TELEPHONE ENCOUNTER
1/14/2020    Future Appointments   Date Time Provider Alice Randolph   4/14/2023 11:00 AM DO RONEL Majano

## 2022-07-28 NOTE — PROGRESS NOTES
Please tell Anahy Ward that I reviewed her chart and the order for the monitor is still listed as active not as complete. Tell her I think her best bet would be to give Dr. Palmer Skill a call and see if he has a result.

## 2022-10-05 RX ORDER — METOPROLOL TARTRATE 100 MG/1
TABLET ORAL
Qty: 30 TABLET | Refills: 1 | Status: SHIPPED | OUTPATIENT
Start: 2022-10-05

## 2022-10-05 NOTE — TELEPHONE ENCOUNTER
1/14/2020    Future Appointments   Date Time Provider Alice Randolph   4/14/2023 11:00 AM DO RONEL Cuellar

## 2022-10-29 ENCOUNTER — APPOINTMENT (OUTPATIENT)
Dept: CT IMAGING | Age: 87
End: 2022-10-29
Payer: MEDICARE

## 2022-10-29 ENCOUNTER — APPOINTMENT (OUTPATIENT)
Dept: GENERAL RADIOLOGY | Age: 87
End: 2022-10-29
Payer: MEDICARE

## 2022-10-29 ENCOUNTER — HOSPITAL ENCOUNTER (EMERGENCY)
Age: 87
Discharge: HOME | End: 2022-10-29
Attending: EMERGENCY MEDICINE
Payer: MEDICARE

## 2022-10-29 VITALS
BODY MASS INDEX: 26.68 KG/M2 | HEIGHT: 62 IN | SYSTOLIC BLOOD PRESSURE: 165 MMHG | HEART RATE: 58 BPM | DIASTOLIC BLOOD PRESSURE: 63 MMHG | WEIGHT: 145 LBS | RESPIRATION RATE: 21 BRPM | TEMPERATURE: 98.6 F | OXYGEN SATURATION: 99 %

## 2022-10-29 DIAGNOSIS — S32.009A CLOSED FRACTURE OF TRANSVERSE PROCESS OF LUMBAR VERTEBRA, INITIAL ENCOUNTER (HCC): Primary | ICD-10-CM

## 2022-10-29 DIAGNOSIS — D53.9 MACROCYTIC ANEMIA: ICD-10-CM

## 2022-10-29 DIAGNOSIS — S76.012A STRAIN OF LEFT PSOAS MUSCLE, INITIAL ENCOUNTER: ICD-10-CM

## 2022-10-29 DIAGNOSIS — R74.8 ELEVATED LIPASE: ICD-10-CM

## 2022-10-29 LAB
A/G RATIO: 1.2 (ref 1.1–2.2)
ALBUMIN SERPL-MCNC: 3.8 G/DL (ref 3.4–5)
ALP BLD-CCNC: 86 U/L (ref 40–129)
ALT SERPL-CCNC: 12 U/L (ref 10–40)
ANION GAP SERPL CALCULATED.3IONS-SCNC: 8 MMOL/L (ref 3–16)
AST SERPL-CCNC: 20 U/L (ref 15–37)
BASOPHILS ABSOLUTE: 0 K/UL (ref 0–0.2)
BASOPHILS RELATIVE PERCENT: 0.6 %
BILIRUB SERPL-MCNC: 0.4 MG/DL (ref 0–1)
BILIRUBIN URINE: NEGATIVE
BLOOD, URINE: NEGATIVE
BUN BLDV-MCNC: 22 MG/DL (ref 7–20)
CALCIUM SERPL-MCNC: 8.9 MG/DL (ref 8.3–10.6)
CHLORIDE BLD-SCNC: 101 MMOL/L (ref 99–110)
CLARITY: CLEAR
CO2: 28 MMOL/L (ref 21–32)
COLOR: YELLOW
CREAT SERPL-MCNC: 1.3 MG/DL (ref 0.6–1.2)
EOSINOPHILS ABSOLUTE: 0.1 K/UL (ref 0–0.6)
EOSINOPHILS RELATIVE PERCENT: 2.1 %
EPITHELIAL CELLS, UA: NORMAL /HPF (ref 0–5)
GFR SERPL CREATININE-BSD FRML MDRD: 40 ML/MIN/{1.73_M2}
GLUCOSE BLD-MCNC: 178 MG/DL (ref 70–99)
GLUCOSE URINE: NEGATIVE MG/DL
HCT VFR BLD CALC: 25.4 % (ref 36–48)
HEMOGLOBIN: 8.4 G/DL (ref 12–16)
KETONES, URINE: NEGATIVE MG/DL
LEUKOCYTE ESTERASE, URINE: ABNORMAL
LIPASE: 89 U/L (ref 13–60)
LYMPHOCYTES ABSOLUTE: 1.4 K/UL (ref 1–5.1)
LYMPHOCYTES RELATIVE PERCENT: 35.8 %
MAGNESIUM: 2 MG/DL (ref 1.8–2.4)
MCH RBC QN AUTO: 33.3 PG (ref 26–34)
MCHC RBC AUTO-ENTMCNC: 33 G/DL (ref 31–36)
MCV RBC AUTO: 101.1 FL (ref 80–100)
MICROSCOPIC EXAMINATION: YES
MONOCYTES ABSOLUTE: 0.3 K/UL (ref 0–1.3)
MONOCYTES RELATIVE PERCENT: 7.8 %
NEUTROPHILS ABSOLUTE: 2.1 K/UL (ref 1.7–7.7)
NEUTROPHILS RELATIVE PERCENT: 53.7 %
NITRITE, URINE: NEGATIVE
PDW BLD-RTO: 16.2 % (ref 12.4–15.4)
PH UA: 7 (ref 5–8)
PLATELET # BLD: 218 K/UL (ref 135–450)
PMV BLD AUTO: 7.8 FL (ref 5–10.5)
POTASSIUM REFLEX MAGNESIUM: 3.5 MMOL/L (ref 3.5–5.1)
PROTEIN UA: NEGATIVE MG/DL
RBC # BLD: 2.51 M/UL (ref 4–5.2)
RBC UA: NORMAL /HPF (ref 0–4)
SODIUM BLD-SCNC: 137 MMOL/L (ref 136–145)
SPECIFIC GRAVITY UA: 1.01 (ref 1–1.03)
TOTAL PROTEIN: 7.1 G/DL (ref 6.4–8.2)
URINE REFLEX TO CULTURE: ABNORMAL
URINE TYPE: ABNORMAL
UROBILINOGEN, URINE: 0.2 E.U./DL
WBC # BLD: 3.9 K/UL (ref 4–11)
WBC UA: NORMAL /HPF (ref 0–5)

## 2022-10-29 PROCEDURE — 83690 ASSAY OF LIPASE: CPT

## 2022-10-29 PROCEDURE — 93005 ELECTROCARDIOGRAM TRACING: CPT | Performed by: EMERGENCY MEDICINE

## 2022-10-29 PROCEDURE — 6370000000 HC RX 637 (ALT 250 FOR IP): Performed by: EMERGENCY MEDICINE

## 2022-10-29 PROCEDURE — 83735 ASSAY OF MAGNESIUM: CPT

## 2022-10-29 PROCEDURE — 99285 EMERGENCY DEPT VISIT HI MDM: CPT

## 2022-10-29 PROCEDURE — 72131 CT LUMBAR SPINE W/O DYE: CPT

## 2022-10-29 PROCEDURE — 85025 COMPLETE CBC W/AUTO DIFF WBC: CPT

## 2022-10-29 PROCEDURE — 80053 COMPREHEN METABOLIC PANEL: CPT

## 2022-10-29 PROCEDURE — 73502 X-RAY EXAM HIP UNI 2-3 VIEWS: CPT

## 2022-10-29 PROCEDURE — 81001 URINALYSIS AUTO W/SCOPE: CPT

## 2022-10-29 PROCEDURE — 72192 CT PELVIS W/O DYE: CPT

## 2022-10-29 RX ORDER — ACETAMINOPHEN 500 MG
1000 TABLET ORAL ONCE
Status: COMPLETED | OUTPATIENT
Start: 2022-10-29 | End: 2022-10-29

## 2022-10-29 RX ORDER — LIDOCAINE 50 MG/G
1 PATCH TOPICAL DAILY
Qty: 10 PATCH | Refills: 0 | Status: SHIPPED | OUTPATIENT
Start: 2022-10-29 | End: 2022-11-03 | Stop reason: SDUPTHER

## 2022-10-29 RX ORDER — LIDOCAINE 4 G/G
1 PATCH TOPICAL ONCE
Status: DISCONTINUED | OUTPATIENT
Start: 2022-10-29 | End: 2022-10-30 | Stop reason: HOSPADM

## 2022-10-29 RX ADMIN — ACETAMINOPHEN 1000 MG: 500 TABLET ORAL at 21:21

## 2022-10-29 ASSESSMENT — PAIN DESCRIPTION - ORIENTATION: ORIENTATION: LOWER

## 2022-10-29 ASSESSMENT — ENCOUNTER SYMPTOMS
NAUSEA: 0
CONSTIPATION: 0
DIARRHEA: 0
EYES NEGATIVE: 1
VOMITING: 0
COUGH: 0
CHEST TIGHTNESS: 0
WHEEZING: 0
ABDOMINAL PAIN: 0
SHORTNESS OF BREATH: 0

## 2022-10-29 ASSESSMENT — PAIN - FUNCTIONAL ASSESSMENT: PAIN_FUNCTIONAL_ASSESSMENT: 0-10

## 2022-10-29 ASSESSMENT — PAIN SCALES - GENERAL
PAINLEVEL_OUTOF10: 5
PAINLEVEL_OUTOF10: 8

## 2022-10-29 ASSESSMENT — PAIN DESCRIPTION - DESCRIPTORS: DESCRIPTORS: ACHING

## 2022-10-29 ASSESSMENT — PAIN DESCRIPTION - LOCATION: LOCATION: BACK

## 2022-10-29 NOTE — ED PROVIDER NOTES
Emergency Department Attending Provider Note  Location: [facility]  ED  10/29/2022     Patient Identification  Elieser Cesar is a 81 y.o. female      Elieser Cesar was evaluated in the Emergency Department for back pain and fall secondary to lightheadedness. Although initial history and physical exam information was obtained by DAVID/NPP/MD/DO (who also dictated a record of this visit), I personally saw the patient and performed a substantive portion of the visit including all aspects of the medical decision making. EKG Interpretation  Rhythm is NSR  Rate is 70  Axis is normal  No STEMI criteria  Qtc is 471      Patient seen and evaluated. Relevant records reviewed. 78-year-old female who presents after fall backwards onto kitchen appliance striking her back not striking her head or her any other traumatic injury otherwise. She reports episode of lightheadedness which she reports is recurrent issue. She denies any syncopal episodes. She has no evidence of arrhythmia here and she is not orthostatic. Her work-up here is notable for L3-L4 left-sided transverse process fractures and adjacent bruising of the psoas. She is not anticoagulated. Also noted acute on chronic microcytic anemia. I have low concern for any retroperitoneal bleed at this point. She reports her symptoms are improved and she is ambulatory here. She has no evidence of blunt kidney injury. Discussed follow-up with orthospine, PCP and close return precautions. She was offered a observation for further syncopal work-up but she is declining any further work-up for her anemia refusing JAMARCUS refusing further testing and wants to go home and she will follow-up with her PCP. I, Dr. Noemi Wells, am the primary clinician of record. I personally saw the patient and independently provided 0 minutes of non-concurrent critical care out of the total shared critical care time provided.     This chart was generated in part by using Dragon Dictation system and may contain errors related to that system including errors in grammar, punctuation, and spelling, as well as words and phrases that may be inappropriate. If there are any questions or concerns please feel free to contact the dictating provider for clarification.      MD Lizzie Cantor MD  10/29/22 [id number]

## 2022-10-30 LAB
EKG ATRIAL RATE: 69 BPM
EKG DIAGNOSIS: NORMAL
EKG P AXIS: 69 DEGREES
EKG P-R INTERVAL: 150 MS
EKG Q-T INTERVAL: 440 MS
EKG QRS DURATION: 138 MS
EKG QTC CALCULATION (BAZETT): 471 MS
EKG R AXIS: 52 DEGREES
EKG T AXIS: -9 DEGREES
EKG VENTRICULAR RATE: 69 BPM

## 2022-10-30 PROCEDURE — 93010 ELECTROCARDIOGRAM REPORT: CPT | Performed by: INTERNAL MEDICINE

## 2022-10-30 NOTE — ED PROVIDER NOTES
Emergency Department Provider Note  Location: [address] [place] [address]  ED  10/29/2022     Patient Identification  Beto Martinez is a 81 y.o. female    Chief Complaint  Fall (States became lightheaded and fell backwards into stove. Denies LOC or falling to ground. C/O lower back pain. )          HPI  (History provided by patient)  Patient presents with complaints of left sided lower back and left hip pain. She reports she became light headed while standing at the table cutting potatoes and she fell back onto the stove. She reports a burning 5/10 pain to her left lower back that radiates through her left hip to her left mid thigh. She reports this pain increases to a sharp 10/10 with movement or ambulation. She denies current lightheadedness or dizziness. She denies any LOC or hitting her head. She states she took 2 ibuprofen at home PTA. 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 Past surgical history:  has a past surgical history that includes knee surgery. Home medications:   Prior to Admission medications    Medication Sig Start Date End Date Taking?  Authorizing Provider   diclofenac sodium (VOLTAREN) 1 % GEL Apply 4 g topically 4 times daily 10/29/22  Yes Yany Stephens MD   lidocaine (LIDODERM) 5 % Place 1 patch onto the skin daily for 10 days 12 hours on, 12 hours off. 10/29/22 11/8/22 Yes Yany Stephens MD   metoprolol (LOPRESSOR) 100 MG tablet TAKE ONE TABLET BY MOUTH DAILY 10/5/22   [facility], DO   IBUPROFEN PO Take by mouth    Historical Provider, MD   alendronate (FOSAMAX) 70 MG tablet Alendronate Oral (Weekly)        active    Historical Provider, MD   cyanocobalamin 1000 MCG tablet Cyanocobalamin Oral active    Historical Provider, MD   hydrocortisone (HYTONE) 2.5 % lotion Hydrocortisone Topical Lotion 2.5 %        active    Historical Provider, MD   famotidine (PEPCID) 20 MG tablet Take 1 tablet by mouth 2 times daily 5/27/22   Nancy Vigil, APRN - CNP   Multiple Vitamin (MULTIVITAMIN PO) Take by mouth    Historical Provider, MD   ferrous sulfate (IRON 325) 325 (65 Fe) MG tablet Take 325 mg by mouth 2 times daily (with meals)    Historical Provider, MD   amLODIPine (NORVASC) 5 MG tablet Take 5 mg by mouth daily    Historical Provider, MD   VENTOLIN  (90 Base) MCG/ACT inhaler INHALE TWO PUFFS BY MOUTH EVERY 6 HOURS AS NEEDED FOR WHEEZING 11/2/20   [facility] Tellez, DO   Handicap Placard MISC by Does not apply route 1/14/20   [facility] Tellez, DO   aspirin 81 MG tablet Take 81 mg by mouth daily. Historical Provider, MD   vitamin D (CHOLECALCIFEROL) 1000 UNIT TABS tablet Take 1,000 Units by mouth daily. Historical Provider, MD       Social history:  reports that she has never smoked. She has never used smokeless tobacco. She reports that she does not drink alcohol and does not use drugs. Family history:  History reviewed. No pertinent family history. ROS  Review of Systems   Constitutional:  Negative for activity change, appetite change, chills, fatigue and fever. HENT: Negative. Eyes: Negative. Respiratory:  Negative for cough, chest tightness, shortness of breath and wheezing. Cardiovascular:  Negative for chest pain, palpitations and leg swelling. Gastrointestinal:  Negative for abdominal pain, constipation, diarrhea, nausea and vomiting. Endocrine: Negative. Genitourinary: Negative. Musculoskeletal:  Negative for gait problem, joint swelling, neck pain and neck stiffness. Left lower back pain    Skin: Negative. Neurological:  Negative for dizziness, tremors, weakness, numbness and headaches.         Positive for episodes of lightheadedness intermittently for the last several weeks   Psychiatric/Behavioral: Negative. Exam  ED Triage Vitals [10/29/22 1913]   BP Temp Temp Source Heart Rate Resp SpO2 Height Weight   (!) 162/65 98.6 °F (37 °C) Oral 72 16 99 % -- --       Physical Exam  Constitutional:       General: She is not in acute distress. Appearance: Normal appearance. She is normal weight. She is not ill-appearing or toxic-appearing. HENT:      Head: Normocephalic and atraumatic. Mouth/Throat:      Mouth: Mucous membranes are moist.      Pharynx: Oropharynx is clear. No oropharyngeal exudate or posterior oropharyngeal erythema. Eyes:      Pupils: Pupils are equal, round, and reactive to light. Cardiovascular:      Rate and Rhythm: Normal rate and regular rhythm. Pulses: Normal pulses. Heart sounds: Normal heart sounds. No murmur heard. No friction rub. No gallop. Pulmonary:      Effort: Pulmonary effort is normal. No respiratory distress. Breath sounds: No wheezing, rhonchi or rales. Abdominal:      General: Abdomen is flat. Bowel sounds are normal. There is no distension. Palpations: Abdomen is soft. There is no mass. Tenderness: There is no abdominal tenderness. There is no guarding or rebound. Hernia: No hernia is present. Musculoskeletal:         General: Normal range of motion. Cervical back: Normal range of motion and neck supple. No rigidity or tenderness. Comments: No left hip tenderness or swelling. No spinal tenderness. Lymphadenopathy:      Cervical: No cervical adenopathy. Skin:     General: Skin is warm and dry. Coloration: Skin is not jaundiced or pale. Findings: No erythema, lesion or rash. Comments: Blue bruising to left lumbar region    Neurological:      General: No focal deficit present. Mental Status: She is alert and oriented to person, place, and time. Cranial Nerves: No cranial nerve deficit. Sensory: No sensory deficit. Motor: No weakness. Coordination: Coordination normal.      Gait: Gait normal.      Deep Tendon Reflexes: Reflexes normal.   Psychiatric:         Mood and Affect: Mood normal.         ED Course    ED Medication Orders (From admission, onward)      Start Ordered     Status Ordering Provider    10/29/22 2115 10/29/22 2101  acetaminophen (TYLENOL) tablet 1,000 mg  ONCE         Last MAR action: Given - by Enrrique Duarte on 10/29/22 at [address], [id number] [address]    10/29/22 2115 10/29/22 2101  lidocaine 4 % external patch 1 patch  ONCE         Last MAR action: Patch Applied - by Enrrique Duarte on 10/29/22 at [address], [id number] [address]            Radiology  CT LUMBAR SPINE WO CONTRAST    Result Date: 10/29/2022  EXAMINATION: CT OF THE LUMBAR SPINE WITHOUT CONTRAST  10/29/2022 TECHNIQUE: CT of the lumbar spine was performed without the administration of intravenous contrast. Multiplanar reformatted images are provided for review. Adjustment of mA and/or kV according to patient size was utilized. Automated exposure control, iterative reconstruction, and/or weight based adjustment of the mA/kV was utilized to reduce the radiation dose to as low as reasonably achievable. COMPARISON: None HISTORY: ORDERING SYSTEM PROVIDED HISTORY: fall, pain TECHNOLOGIST PROVIDED HISTORY: Reason for exam:->fall, pain Decision Support Exception - unselect if not a suspected or confirmed emergency medical condition->Emergency Medical Condition (MA) Reason for Exam: GLF backwards, low back and tailbone pain FINDINGS: BONES/ALIGNMENT: Multiple contiguous axial images were obtained of the lumbar spine. Sagittal and coronal reformats were obtained. Multilevel disc space narrowing is seen with endplate osteophytosis, in keeping with degenerative disc disease. Vacuum disc phenomenon is seen at T11-T12, L1-L2, L3-L4, L4-L5, L5-S1. No marked vertebral body height loss. No spondylolisthesis. The facets are aligned.   Linear lucency is demonstrated at the left L4 transverse process, without significant displacement. Fracture is seen of the left L3 transverse process, with mild anterior displacement. Broad disc bulge at L2-L3, flattening the ventral thecal sac. Broad disc bulge and ligamentum flavum hypertrophy at L3-L4, with moderate spinal canal narrowing. Disc bulge and ligamentum flavum hypertrophy at L4-L5, with moderate spinal canal narrowing. Disc bulge at L5-S1 with abutment of bilateral exiting nerve roots. Calcification of the abdominal aorta. Right upper quadrant clips. Bilateral renal pelviectasis. Fractures of left L3 and L4 transverse processes. Multilevel degenerative disc disease as discussed above. CT PELVIS WO CONTRAST Additional Contrast? None    Result Date: 10/29/2022  EXAMINATION: CT OF THE PELVIS WITHOUT CONTRAST 10/29/2022 9:44 pm TECHNIQUE: CT of the pelvis was performed without the administration of intravenous contrast.  Multiplanar reformatted images are provided for review. Adjustment of mA and/or kV according to patient size was utilized. Automated exposure control, iterative reconstruction, and/or weight based adjustment of the mA/kV was utilized to reduce the radiation dose to as low as reasonably achievable. COMPARISON: None. HISTORY: ORDERING SYSTEM PROVIDED HISTORY: fall TECHNOLOGIST PROVIDED HISTORY: Reason for exam:->fall Additional Contrast?->None Decision Support Exception - unselect if not a suspected or confirmed emergency medical condition->Emergency Medical Condition (MA) Reason for Exam: GLF backwards, low back/tailbone pain, left hip pain FINDINGS: Degenerative changes of the lower lumbar spine. Mild degenerative changes of the bilateral sacroiliac joints and pubic symphysis. Mild degenerative changes of the bilateral hips. No fracture or dislocation. No suspicious lytic or blastic osseous lesion. The urinary bladder is normal in appearance. The uterus is unremarkable.  There is a 3.3 cm right adnexal lesion with areas of macroscopic fat. The left adnexa is unremarkable. Colonic diverticulosis. Nonspecific stranding in the left pericolic gutter and along the left pelvic sidewall. Asymmetrically enlarged left psoas muscle. No free fluid in the pelvis. No pelvic or inguinal lymphadenopathy. 1. No acute osseous abnormality. 2. 3.3 cm right adnexal dermoid. 3. Nonspecific stranding along the left pericolic gutter and pelvic sidewall. Adjacent asymmetrically enlarged left psoas muscle possibly representing a strain or intramuscular hematoma. XR HIP 2-3 VW W PELVIS LEFT    Result Date: 10/29/2022  EXAMINATION: ONE XRAY VIEW OF THE PELVIS AND TWO XRAY VIEWS LEFT HIP 10/29/2022 8:29 pm COMPARISON: None. HISTORY: ORDERING SYSTEM PROVIDED HISTORY: fall TECHNOLOGIST PROVIDED HISTORY: Reason for exam:->fall Reason for Exam: Fall FINDINGS: Two views were obtained the left hip. Corticated ossific density adjacent to the greater trochanter, likely degenerative or related to remote trauma. No acute appearing fracture. No dislocation. Pelvic vascular calcifications. Atherosclerosis is seen within the proximal thighs. Sacroiliac joints are symmetric. No dislocation or gross fracture.         Labs  Results for orders placed or performed during the hospital encounter of 10/29/22   CBC with Auto Differential   Result Value Ref Range    WBC 3.9 (L) 4.0 - 11.0 K/uL    RBC 2.51 (L) 4.00 - 5.20 M/uL    Hemoglobin 8.4 (L) 12.0 - 16.0 g/dL    Hematocrit 25.4 (L) 36.0 - 48.0 %    .1 (H) 80.0 - 100.0 fL    MCH 33.3 26.0 - 34.0 pg    MCHC 33.0 31.0 - 36.0 g/dL    RDW 16.2 (H) 12.4 - 15.4 %    Platelets 542 398 - 703 K/uL    MPV 7.8 5.0 - 10.5 fL    Neutrophils % 53.7 %    Lymphocytes % 35.8 %    Monocytes % 7.8 %    Eosinophils % 2.1 %    Basophils % 0.6 %    Neutrophils Absolute 2.1 1.7 - 7.7 K/uL    Lymphocytes Absolute 1.4 1.0 - 5.1 K/uL    Monocytes Absolute 0.3 0.0 - 1.3 K/uL    Eosinophils Absolute 0.1 0.0 - 0.6 K/uL    Basophils Absolute 0.0 0.0 - 0.2 K/uL   CMP w/ Reflex to MG   Result Value Ref Range    Sodium 137 136 - 145 mmol/L    Potassium reflex Magnesium 3.5 3.5 - 5.1 mmol/L    Chloride 101 99 - 110 mmol/L    CO2 28 21 - 32 mmol/L    Anion Gap 8 3 - 16    Glucose 178 (H) 70 - 99 mg/dL    BUN 22 (H) 7 - 20 mg/dL    Creatinine 1.3 (H) 0.6 - 1.2 mg/dL    Est, Glom Filt Rate 40 (A) >60    Calcium 8.9 8.3 - 10.6 mg/dL    Total Protein 7.1 6.4 - 8.2 g/dL    Albumin 3.8 3.4 - 5.0 g/dL    Albumin/Globulin Ratio 1.2 1.1 - 2.2    Total Bilirubin 0.4 0.0 - 1.0 mg/dL    Alkaline Phosphatase 86 40 - 129 U/L    ALT 12 10 - 40 U/L    AST 20 15 - 37 U/L   Lipase   Result Value Ref Range    Lipase 89.0 (H) 13.0 - 60.0 U/L   Urinalysis with Reflex to Culture    Specimen: Urine   Result Value Ref Range    Color, UA Yellow Straw/Yellow    Clarity, UA Clear Clear    Glucose, Ur Negative Negative mg/dL    Bilirubin Urine Negative Negative    Ketones, Urine Negative Negative mg/dL    Specific Gravity, UA 1.010 1.005 - 1.030    Blood, Urine Negative Negative    pH, UA 7.0 5.0 - 8.0    Protein, UA Negative Negative mg/dL    Urobilinogen, Urine 0.2 <2.0 E.U./dL    Nitrite, Urine Negative Negative    Leukocyte Esterase, Urine SMALL (A) Negative    Microscopic Examination YES     Urine Type NotGiven     Urine Reflex to Culture Not Indicated    Magnesium   Result Value Ref Range    Magnesium 2.00 1.80 - 2.40 mg/dL   Microscopic Urinalysis   Result Value Ref Range    WBC, UA 3-5 0 - 5 /HPF    RBC, UA None seen 0 - 4 /HPF    Epithelial Cells, UA 2-5 0 - 5 /HPF   EKG 12 Lead   Result Value Ref Range    Ventricular Rate 69 BPM    Atrial Rate 69 BPM    P-R Interval 150 ms    QRS Duration 138 ms    Q-T Interval 440 ms    QTc Calculation (Bazett) 471 ms    P Axis 69 degrees    R Axis 52 degrees    T Axis -9 degrees    Diagnosis       Normal sinus rhythmRight bundle branch blockSeptal infarct , age undeterminedAbnormal ECGWhen compared with ECG of 05-FEB-2016 10:39,Right bundle branch block has replaced Incomplete right bundle branch blockSeptal infarct is now Present         MDM  Patient seen and evaluated. Relevant records reviewed. 81year old female presents with left lower back and left hip pain after becoming lightheaded and falling onto her stove. She denies LOC, hitting her head, or falling to the ground. She states she has been having a few episodes of lightheadedness over the past several weeks. She denies any lightheadedness during visit. Upon examination, patient is non-ill, non toxic appearing. She is hemodynamically stable. She is neurologically and neurovascularly intact. She has tenderness to left lumbar region with mild blue bruising present. She has no spinal or hip joint tenderness. She is able to bear weight and has FROM. Left hip x-ray is without acute fracture or dislocation. There is no leukocytosis or electrolyte imbalances. BUN and creatinine mildly elevated. Her hemoglobin is 8.4, drop from 9.8 in May 2022. She reports she has dark stools due to iron and multivitamin supplements daily. She does state that when she misses her doses her stool is lighter. She denies and NVD. Denies any unusual bleeding. Discussion and education with patient regarding recommendation for rectal exam during visit, patient refusing at this time. Patient insists on following up for laboratory values with PCP Dr Charlie Joiner. Patient's daughter was present and informs that Dr Charlie Joiner sent a message today that he wanted to see the patient for a visit soon and they would be calling to get in. Daughter further states patient has a history of low hemoglobin and was seeing a hematologist until last year when she decided to quit going. She states she will contact both the PCP and hematologist for follow up. Lumbar CT with L3-L4 fractures of the transverse processes.  CT pelvis with nonspecific stranding along the left pericolic gutter and pelvic sidewall. Adjacent asymmetrically enlarged left psoas muscle possibly representing a strain or intramuscular hematoma. ER attending consulted with orthopaedics. Patient will follow up with with orthopaedics as an outpatient. Per her request, patient will follow up on labs with PCP and hematologist.     Clinical Impression:  1. Closed fracture of transverse process of lumbar vertebra, initial encounter ([facility] [address]    2. Strain of left psoas muscle, initial encounter    3. Macrocytic anemia    4. Elevated lipase          Disposition:  Discharge to home in good condition. Blood pressure (!) 165/63, pulse 58, temperature 98.6 °F (37 °C), temperature source Oral, resp. rate 21, height 5' 2\" (1.575 m), weight 145 lb (65.8 kg), SpO2 99 %, not currently breastfeeding. Patient was given scripts for the following medications. I counseled patient how to take these medications. New Prescriptions    DICLOFENAC SODIUM (VOLTAREN) 1 % GEL    Apply 4 g topically 4 times daily    LIDOCAINE (LIDODERM) 5 %    Place 1 patch onto the skin daily for 10 days 12 hours on, 12 hours off. Disposition referral (if applicable):  Jameel Barrios DO  [address] [place] [id number] [phone]    Schedule an appointment as soon as possible for a visit       Kristel Berry MD  [facility] [address]    Schedule an appointment as soon as possible for a visit         Total critical care time is 0 minutes, which excludes separately billable procedures and updating family. Time spent is specifically for management of the presenting complaint and symptoms initially, direct bedside care, reevaluation, review of records, and consultation. There was a high probability of clinically significant life-threatening deterioration in the patient's condition, which required my urgent intervention.      This chart was generated in part by using Dragon Dictation system and may contain errors related to that system including errors in grammar, punctuation, and spelling, as well as words and phrases that may be inappropriate. If there are any questions or concerns please feel free to contact the dictating provider for clarification.         Nelsy Corbett  10/29/22 8430

## 2022-10-30 NOTE — ED NOTES
[id number] for d/c. [facility] pt and daughter re:d/c instructions, no questions at this time. Stable, by wheelchair, left w/ all belongings and family.       Joslyn Hernandez RN  10/29/22 3129

## 2022-10-30 NOTE — ED NOTES
Pt ambulated approx 50 ft w/o assistance. Pt c/o minimal pain. Used bathroom w/o assistance and back to bed. Safety precautions remain in place.       Jaclyn Padgett RN  10/29/22 3966

## 2022-10-31 ENCOUNTER — TELEPHONE (OUTPATIENT)
Dept: FAMILY MEDICINE CLINIC | Age: 87
End: 2022-10-31

## 2022-10-31 NOTE — TELEPHONE ENCOUNTER
Appt scld  Future Appointments   Date Time Provider Alice Randolph   11/2/2022 10:30 AM MD DYLON Castillo [place] - [facility]   4/14/2023 11:00 AM DO RONEL Dailey

## 2022-10-31 NOTE — TELEPHONE ENCOUNTER
----- Message from Nancy Winkler sent at 10/31/2022  1:56 PM EDT -----  Subject: Appointment Request    Reason for Call: Established Patient Appointment needed: Routine Existing   Condition Follow Up    QUESTIONS    Reason for appointment request? Available appointments did not meet   patient need     Additional Information for Provider? pt is needing a f/u to a fall appt. pt is willing to see anyone in the office.   ---------------------------------------------------------------------------  --------------  Massiel Subramanian INFO  [phone]; OK to leave message on voicemail  ---------------------------------------------------------------------------  --------------  SCRIPT ANSWERS  COVID Screen: Alicia Hernandez

## 2022-11-03 ENCOUNTER — TELEPHONE (OUTPATIENT)
Dept: CARDIOLOGY CLINIC | Age: 87
End: 2022-11-03

## 2022-11-03 ENCOUNTER — OFFICE VISIT (OUTPATIENT)
Dept: FAMILY MEDICINE CLINIC | Age: 87
End: 2022-11-03
Payer: MEDICARE

## 2022-11-03 ENCOUNTER — TELEPHONE (OUTPATIENT)
Dept: FAMILY MEDICINE CLINIC | Age: 87
End: 2022-11-03

## 2022-11-03 VITALS
HEART RATE: 58 BPM | BODY MASS INDEX: 24.48 KG/M2 | DIASTOLIC BLOOD PRESSURE: 83 MMHG | TEMPERATURE: 97.1 F | WEIGHT: 133 LBS | OXYGEN SATURATION: 97 % | SYSTOLIC BLOOD PRESSURE: 179 MMHG | HEIGHT: 62 IN

## 2022-11-03 DIAGNOSIS — D64.9 ANEMIA, UNSPECIFIED TYPE: ICD-10-CM

## 2022-11-03 DIAGNOSIS — R42 DIZZINESS: ICD-10-CM

## 2022-11-03 DIAGNOSIS — M80.00XD AGE-RELATED OSTEOPOROSIS WITH CURRENT PATHOLOGICAL FRACTURE WITH ROUTINE HEALING, SUBSEQUENT ENCOUNTER: ICD-10-CM

## 2022-11-03 DIAGNOSIS — I10 PRIMARY HYPERTENSION: ICD-10-CM

## 2022-11-03 DIAGNOSIS — S22.009D CLOSED FRACTURE OF TRANSVERSE PROCESS OF THORACIC VERTEBRA WITH ROUTINE HEALING, SUBSEQUENT ENCOUNTER: Primary | ICD-10-CM

## 2022-11-03 PROCEDURE — 1123F ACP DISCUSS/DSCN MKR DOCD: CPT | Performed by: FAMILY MEDICINE

## 2022-11-03 PROCEDURE — 99214 OFFICE O/P EST MOD 30 MIN: CPT | Performed by: FAMILY MEDICINE

## 2022-11-03 RX ORDER — LIDOCAINE 50 MG/G
1 PATCH TOPICAL DAILY
Qty: 10 PATCH | Refills: 0 | Status: SHIPPED | OUTPATIENT
Start: 2022-11-03 | End: 2022-11-13

## 2022-11-03 ASSESSMENT — ENCOUNTER SYMPTOMS
WHEEZING: 0
COUGH: 0
CHOKING: 0
ABDOMINAL PAIN: 0
BACK PAIN: 0
SHORTNESS OF BREATH: 0
CHEST TIGHTNESS: 0
STRIDOR: 0

## 2022-11-03 NOTE — TELEPHONE ENCOUNTER
Chiara returned our call and stated she just does the scheduling and is not aware of what code to suggest.

## 2022-11-03 NOTE — TELEPHONE ENCOUNTER
Dr. Iván Pruitt is a [facility] physician and results are in [address] in pt's chart but I will route directly to Dr. Iván Pruitt.

## 2022-11-03 NOTE — TELEPHONE ENCOUNTER
Chiara with [address]  informed that diagnosis code for dexa scan is not acceptable. Please order with acceptable diagnosis and call patient when done.

## 2022-11-03 NOTE — TELEPHONE ENCOUNTER
Bi from Dr. Jannie Contreras office stated that pt was suppose to wear an even monitor. If pt did we need to fax the results to [phone]. Please advise.

## 2022-11-03 NOTE — PROGRESS NOTES
Subjective:      Patient ID: Arnoldo Mccurdy is a 81 y.o. female. IGOR Vidal is here for follow-up on a fall which resulted in a fracture of lumbar transverse processes. She is seeing hematology regarding her anemia. Her fall was a result of an episode of dizziness which seem to be precipitated by extending her neck. I ordered a DEXA scan. Review of Systems   Constitutional:  Negative for activity change, appetite change, chills, diaphoresis, fatigue, fever and unexpected weight change. Respiratory:  Negative for cough, choking, chest tightness, shortness of breath, wheezing and stridor. Cardiovascular:  Negative for chest pain, palpitations and leg swelling. Gastrointestinal:  Negative for abdominal pain. Genitourinary:  Negative for difficulty urinating. Musculoskeletal:  Negative for arthralgias and back pain. Skin:  Negative for rash. Neurological:  Negative for dizziness. Objective:   Physical Exam  Vitals and nursing note reviewed. Constitutional:       Appearance: She is well-developed. HENT:      Head: Normocephalic and atraumatic. Right Ear: External ear normal.      Left Ear: External ear normal.      Nose: Nose normal.   Eyes:      Conjunctiva/sclera: Conjunctivae normal.      Pupils: Pupils are equal, round, and reactive to light. Neck:      Thyroid: No thyromegaly. Vascular: No JVD. Trachea: No tracheal deviation. Cardiovascular:      Rate and Rhythm: Normal rate and regular rhythm. Heart sounds: Normal heart sounds. No murmur heard. No friction rub. No gallop. Pulmonary:      Effort: Pulmonary effort is normal. No respiratory distress. Breath sounds: Normal breath sounds. No stridor. No wheezing or rales. Chest:      Chest wall: No tenderness. Abdominal:      General: Bowel sounds are normal. There is no distension. Palpations: Abdomen is soft. There is no mass. Tenderness: There is no abdominal tenderness.  There is no guarding or rebound. Musculoskeletal:         General: No tenderness. Normal range of motion. Cervical back: Normal range of motion and neck supple. Lymphadenopathy:      Cervical: No cervical adenopathy. Skin:     General: Skin is warm and dry. Coloration: Skin is not pale. Findings: No erythema or rash. Neurological:      Mental Status: She is alert and oriented to person, place, and time. Cranial Nerves: No cranial nerve deficit. Motor: No abnormal muscle tone. Coordination: Coordination normal.      Deep Tendon Reflexes: Reflexes are normal and symmetric. Reflexes normal.       Assessment / Plan:         1. Anemia, unspecified type-Kelly requested a second opinion and I referred her to Dr. Jannie Cerrato MD, Oncology, [facility]    2. Closed fracture of transverse process of thoracic vertebra with routine healing, subsequent encounter-healing well. Continue with orthopedics      3. Dizziness-recommend neurology consult with Dr. Laura Roberto MD, Neurology, [facility]    4. Age-related osteoporosis with current pathological fracture with routine healing, subsequent encounter-ordered DEXA scan    - DEXA BONE DENSITY AXIAL SKELETON; Future    5. Primary hypertension-poorly controlled here today. I asked Magno Aguila to measure her blood pressure every day for the next 10 to 14 days and then call us with numbers.   May need to adjust antihypertensive meds

## 2022-11-04 ENCOUNTER — HOSPITAL ENCOUNTER (OUTPATIENT)
Dept: WOMENS IMAGING | Age: 87
Discharge: HOME | End: 2022-11-04
Payer: MEDICARE

## 2022-11-04 DIAGNOSIS — Z12.31 ENCOUNTER FOR SCREENING MAMMOGRAM FOR BREAST CANCER: ICD-10-CM

## 2022-11-04 PROCEDURE — 77067 SCR MAMMO BI INCL CAD: CPT

## 2022-11-17 ENCOUNTER — TELEPHONE (OUTPATIENT)
Dept: FAMILY MEDICINE CLINIC | Age: 87
End: 2022-11-17

## 2022-11-28 ENCOUNTER — TELEPHONE (OUTPATIENT)
Dept: FAMILY MEDICINE CLINIC | Age: 87
End: 2022-11-28

## 2022-11-28 DIAGNOSIS — E28.39 OVARIAN FAILURE: Primary | ICD-10-CM

## 2022-12-05 RX ORDER — METOPROLOL TARTRATE 100 MG/1
TABLET ORAL
Qty: 30 TABLET | Refills: 1 | Status: SHIPPED | OUTPATIENT
Start: 2022-12-05

## 2022-12-05 NOTE — TELEPHONE ENCOUNTER
LAST OV 11/03/2022   Future Appointments   Date Time Provider Alice Randolph   12/8/2022  1:00 PM [facility] EG WC DEXA [facility] EG WC Jadyn Rad   1/5/2023 10:30 AM DO DYLON Garcia [place] - DYD   4/14/2023 11:00 AM DO NARINDER McdonaldPHYSKVLADIMIR MMA

## 2022-12-08 ENCOUNTER — HOSPITAL ENCOUNTER (OUTPATIENT)
Dept: WOMENS IMAGING | Age: 87
Discharge: HOME | End: 2022-12-08
Payer: MEDICARE

## 2022-12-08 DIAGNOSIS — E28.39 OVARIAN FAILURE: ICD-10-CM

## 2022-12-08 DIAGNOSIS — M80.00XD AGE-RELATED OSTEOPOROSIS WITH CURRENT PATHOLOGICAL FRACTURE WITH ROUTINE HEALING, SUBSEQUENT ENCOUNTER: ICD-10-CM

## 2022-12-08 PROCEDURE — 77080 DXA BONE DENSITY AXIAL: CPT

## 2022-12-09 ENCOUNTER — TELEPHONE (OUTPATIENT)
Dept: FAMILY MEDICINE CLINIC | Age: 87
End: 2022-12-09

## 2022-12-09 NOTE — TELEPHONE ENCOUNTER
Please tell Reynaldo Early that her DEXA scan shows osteoporosis. Ask her if she has been taking her Fosamax and let me know.

## 2022-12-21 ENCOUNTER — TELEPHONE (OUTPATIENT)
Dept: FAMILY MEDICINE CLINIC | Age: 87
End: 2022-12-21

## 2022-12-21 RX ORDER — AMLODIPINE BESYLATE 5 MG/1
5 TABLET ORAL DAILY
Qty: 30 TABLET | Refills: 2 | Status: SHIPPED | OUTPATIENT
Start: 2022-12-21

## 2022-12-21 NOTE — PROGRESS NOTES
I talked to Estephania Gallego who is Kelly's daughter. She tells me that on multiple occasions her blood pressures been in the 160s to 170 over 70s. I sent Tim Vilchis in a prescription for Norvasc 5 1 daily and asked Estephania Gallego to call me in a week with blood pressure numbers. She agreed.

## 2022-12-21 NOTE — TELEPHONE ENCOUNTER
Pt daughter called because she said her mother's BP has been running high for the last couple of months. Averaging 140-160 over 70. Does she need to make any adjustments? Please advise.

## 2022-12-28 NOTE — TELEPHONE ENCOUNTER
201 Raymond Brewer DO  Family Medicine     Progress Notes  201 Raymond Brewer DO (Physician)   Family Medicine  I talked to Emily Dugan who is Kelly's daughter. She tells me that on multiple occasions her blood pressures been in the 160s to 170 over 70s. I sent Yesika Mckeon in a prescription for Norvasc 5 1 daily and asked Emily Dugan to call me in a week with blood pressure numbers.   She agreed

## 2023-01-02 SDOH — HEALTH STABILITY: PHYSICAL HEALTH: ON AVERAGE, HOW MANY DAYS PER WEEK DO YOU ENGAGE IN MODERATE TO STRENUOUS EXERCISE (LIKE A BRISK WALK)?: 3 DAYS

## 2023-01-02 SDOH — HEALTH STABILITY: PHYSICAL HEALTH: ON AVERAGE, HOW MANY MINUTES DO YOU ENGAGE IN EXERCISE AT THIS LEVEL?: 30 MIN

## 2023-01-02 ASSESSMENT — PATIENT HEALTH QUESTIONNAIRE - PHQ9
SUM OF ALL RESPONSES TO PHQ QUESTIONS 1-9: 0
2. FEELING DOWN, DEPRESSED OR HOPELESS: 0
1. LITTLE INTEREST OR PLEASURE IN DOING THINGS: 0
SUM OF ALL RESPONSES TO PHQ9 QUESTIONS 1 & 2: 0
SUM OF ALL RESPONSES TO PHQ QUESTIONS 1-9: 0

## 2023-01-02 ASSESSMENT — LIFESTYLE VARIABLES
HOW OFTEN DO YOU HAVE A DRINK CONTAINING ALCOHOL: NEVER
HOW OFTEN DO YOU HAVE SIX OR MORE DRINKS ON ONE OCCASION: 1
HOW MANY STANDARD DRINKS CONTAINING ALCOHOL DO YOU HAVE ON A TYPICAL DAY: PATIENT DOES NOT DRINK
HOW OFTEN DO YOU HAVE A DRINK CONTAINING ALCOHOL: 1
HOW MANY STANDARD DRINKS CONTAINING ALCOHOL DO YOU HAVE ON A TYPICAL DAY: 0

## 2023-01-05 ENCOUNTER — OFFICE VISIT (OUTPATIENT)
Dept: FAMILY MEDICINE CLINIC | Age: 88
End: 2023-01-05

## 2023-01-05 VITALS
WEIGHT: 130.4 LBS | TEMPERATURE: 97.6 F | HEART RATE: 67 BPM | BODY MASS INDEX: 24.62 KG/M2 | OXYGEN SATURATION: 99 % | SYSTOLIC BLOOD PRESSURE: 152 MMHG | DIASTOLIC BLOOD PRESSURE: 77 MMHG | HEIGHT: 61 IN

## 2023-01-05 DIAGNOSIS — Z00.00 MEDICARE ANNUAL WELLNESS VISIT, SUBSEQUENT: Primary | ICD-10-CM

## 2023-01-05 DIAGNOSIS — M80.00XG AGE-RELATED OSTEOPOROSIS WITH CURRENT PATHOLOGICAL FRACTURE WITH DELAYED HEALING, SUBSEQUENT ENCOUNTER: ICD-10-CM

## 2023-01-05 DIAGNOSIS — J45.21 MILD INTERMITTENT REACTIVE AIRWAY DISEASE WITH ACUTE EXACERBATION: ICD-10-CM

## 2023-01-05 RX ORDER — ALENDRONATE SODIUM 70 MG/1
TABLET ORAL
Qty: 4 TABLET | Refills: 2 | Status: SHIPPED | OUTPATIENT
Start: 2023-01-05

## 2023-01-05 RX ORDER — ALBUTEROL SULFATE 90 UG/1
AEROSOL, METERED RESPIRATORY (INHALATION)
Qty: 18 G | Refills: 2 | Status: SHIPPED | OUTPATIENT
Start: 2023-01-05

## 2023-01-05 ASSESSMENT — ENCOUNTER SYMPTOMS
BACK PAIN: 0
CHOKING: 0
SHORTNESS OF BREATH: 0
COUGH: 0
ABDOMINAL PAIN: 0
STRIDOR: 0
CHEST TIGHTNESS: 0
WHEEZING: 0

## 2023-01-05 NOTE — PROGRESS NOTES
Subjective:      Patient ID: Azzie Bloch is a 81 y.o. female. IGOR Ahmadi is here for Medicare annual wellness visit. She has some neck pain that seems to be positional probably due to too thick a bed pillow and the fact that she spends quite a bit of time each day bending over playing games on her smart phone. Her recent labs showed osteoporosis. Review of Systems   Constitutional:  Negative for activity change, appetite change, chills, diaphoresis, fatigue, fever and unexpected weight change. Respiratory:  Negative for cough, choking, chest tightness, shortness of breath, wheezing and stridor. Cardiovascular:  Negative for chest pain, palpitations and leg swelling. Gastrointestinal:  Negative for abdominal pain. Genitourinary:  Negative for difficulty urinating. Musculoskeletal:  Positive for neck pain and neck stiffness. Negative for arthralgias and back pain. Skin:  Negative for rash. Neurological:  Negative for dizziness. Objective:   Physical Exam  Vitals and nursing note reviewed. Constitutional:       Appearance: She is well-developed. HENT:      Head: Normocephalic and atraumatic. Right Ear: External ear normal.      Left Ear: External ear normal.      Nose: Nose normal.   Eyes:      Conjunctiva/sclera: Conjunctivae normal.      Pupils: Pupils are equal, round, and reactive to light. Neck:      Thyroid: No thyromegaly. Vascular: No JVD. Trachea: No tracheal deviation. Cardiovascular:      Rate and Rhythm: Normal rate and regular rhythm. Heart sounds: Normal heart sounds. No murmur heard. No friction rub. No gallop. Pulmonary:      Effort: Pulmonary effort is normal. No respiratory distress. Breath sounds: Normal breath sounds. No stridor. No wheezing or rales. Chest:      Chest wall: No tenderness. Abdominal:      General: Bowel sounds are normal. There is no distension. Palpations: Abdomen is soft. There is no mass.       Tenderness: There is no abdominal tenderness. There is no guarding or rebound. Musculoskeletal:         General: No tenderness. Normal range of motion. Cervical back: Normal range of motion and neck supple. Lymphadenopathy:      Cervical: No cervical adenopathy. Skin:     General: Skin is warm and dry. Coloration: Skin is not pale. Findings: No erythema or rash. Neurological:      Mental Status: She is alert and oriented to person, place, and time. Cranial Nerves: No cranial nerve deficit. Motor: No abnormal muscle tone. Coordination: Coordination normal.      Deep Tendon Reflexes: Reflexes are normal and symmetric. Reflexes normal.       Assessment / Plan:         1. Medicare annual wellness visit, subsequent-    - albuterol sulfate HFA (VENTOLIN HFA) 108 (90 Base) MCG/ACT inhaler; INHALE TWO PUFFS BY MOUTH EVERY 6 HOURS AS NEEDED FOR WHEEZING  Dispense: 18 g; Refill: 2    2. Mild intermittent reactive airway disease with acute exacerbation-albuterol inhaler    - albuterol sulfate HFA (VENTOLIN HFA) 108 (90 Base) MCG/ACT inhaler; INHALE TWO PUFFS BY MOUTH EVERY 6 HOURS AS NEEDED FOR WHEEZING  Dispense: 18 g; Refill: 2    3. Age-related osteoporosis with current pathological fracture with delayed healing, subsequent encounter-prescribed Fosamax 70 with the usual cautions about remaining upright for 30 minutes after the dose. Before starting this medication I asked Manas Raman to call her hematologist Dr. Rose Gallgeos to be sure that he felt there were no potential interactions between Fosamax and her current meds or meds that he is planning to use in the near future. She agreed.

## 2023-01-30 RX ORDER — METOPROLOL TARTRATE 100 MG/1
TABLET ORAL
Qty: 30 TABLET | Refills: 1 | Status: SHIPPED | OUTPATIENT
Start: 2023-01-30

## 2023-01-30 NOTE — TELEPHONE ENCOUNTER
Last ov 01/05/2023   Future Appointments   Date Time Provider Alice Randolph   3/28/2023 10:00 AM DO [facility] Salas

## 2023-03-20 RX ORDER — AMLODIPINE BESYLATE 5 MG/1
TABLET ORAL
Qty: 30 TABLET | Refills: 2 | Status: SHIPPED | OUTPATIENT
Start: 2023-03-20

## 2023-03-20 NOTE — TELEPHONE ENCOUNTER
1/5/2023    Future Appointments   Date Time Provider Alice Randolph   3/28/2023 10:00 AM DO DYLON Carbajal

## 2023-03-23 RX ORDER — METOPROLOL TARTRATE 100 MG/1
TABLET ORAL
Qty: 30 TABLET | Refills: 1 | Status: SHIPPED | OUTPATIENT
Start: 2023-03-23

## 2023-03-23 RX ORDER — ALENDRONATE SODIUM 70 MG/1
TABLET ORAL
Qty: 4 TABLET | Refills: 2 | Status: SHIPPED | OUTPATIENT
Start: 2023-03-23

## 2023-03-23 NOTE — TELEPHONE ENCOUNTER
Future Appointments   Date Time Provider Alice Randolph   3/28/2023 10:00 AM DO DYLON Sands - [facility] 1/5/2023

## 2023-03-28 ENCOUNTER — OFFICE VISIT (OUTPATIENT)
Dept: FAMILY MEDICINE CLINIC | Age: 88
End: 2023-03-28
Payer: MEDICARE

## 2023-03-28 VITALS
HEIGHT: 62 IN | OXYGEN SATURATION: 97 % | BODY MASS INDEX: 24.69 KG/M2 | HEART RATE: 71 BPM | WEIGHT: 134.2 LBS | SYSTOLIC BLOOD PRESSURE: 156 MMHG | DIASTOLIC BLOOD PRESSURE: 81 MMHG

## 2023-03-28 DIAGNOSIS — Z00.00 MEDICARE ANNUAL WELLNESS VISIT, SUBSEQUENT: Primary | ICD-10-CM

## 2023-03-28 DIAGNOSIS — L29.9 PRURITUS: ICD-10-CM

## 2023-03-28 PROCEDURE — 1123F ACP DISCUSS/DSCN MKR DOCD: CPT | Performed by: FAMILY MEDICINE

## 2023-03-28 PROCEDURE — G0439 PPPS, SUBSEQ VISIT: HCPCS | Performed by: FAMILY MEDICINE

## 2023-03-28 SDOH — ECONOMIC STABILITY: FOOD INSECURITY: WITHIN THE PAST 12 MONTHS, THE FOOD YOU BOUGHT JUST DIDN'T LAST AND YOU DIDN'T HAVE MONEY TO GET MORE.: NEVER TRUE

## 2023-03-28 SDOH — ECONOMIC STABILITY: INCOME INSECURITY: HOW HARD IS IT FOR YOU TO PAY FOR THE VERY BASICS LIKE FOOD, HOUSING, MEDICAL CARE, AND HEATING?: NOT VERY HARD

## 2023-03-28 SDOH — ECONOMIC STABILITY: HOUSING INSECURITY
IN THE LAST 12 MONTHS, WAS THERE A TIME WHEN YOU DID NOT HAVE A STEADY PLACE TO SLEEP OR SLEPT IN A SHELTER (INCLUDING NOW)?: NO

## 2023-03-28 SDOH — ECONOMIC STABILITY: FOOD INSECURITY: WITHIN THE PAST 12 MONTHS, YOU WORRIED THAT YOUR FOOD WOULD RUN OUT BEFORE YOU GOT MONEY TO BUY MORE.: NEVER TRUE

## 2023-03-28 ASSESSMENT — PATIENT HEALTH QUESTIONNAIRE - PHQ9
SUM OF ALL RESPONSES TO PHQ QUESTIONS 1-9: 0
SUM OF ALL RESPONSES TO PHQ9 QUESTIONS 1 & 2: 0
1. LITTLE INTEREST OR PLEASURE IN DOING THINGS: 0
SUM OF ALL RESPONSES TO PHQ QUESTIONS 1-9: 0
2. FEELING DOWN, DEPRESSED OR HOPELESS: 0

## 2023-03-28 ASSESSMENT — ENCOUNTER SYMPTOMS
CHOKING: 0
STRIDOR: 0
BACK PAIN: 0
ABDOMINAL PAIN: 0
SHORTNESS OF BREATH: 0
CHEST TIGHTNESS: 0
WHEEZING: 0
COUGH: 0

## 2023-03-28 ASSESSMENT — LIFESTYLE VARIABLES
HOW MANY STANDARD DRINKS CONTAINING ALCOHOL DO YOU HAVE ON A TYPICAL DAY: PATIENT DOES NOT DRINK
HOW OFTEN DO YOU HAVE A DRINK CONTAINING ALCOHOL: NEVER

## 2023-03-28 NOTE — PROGRESS NOTES
Subjective:      Patient ID: Ramiro Yip is a 81 y.o. female. IGOR Gandhi is here for Medicare annual wellness visit. Her only complaint is that of itching in the area of the left scapula. -There is no rash in the area of itching. Review of Systems   Constitutional:  Negative for activity change, appetite change, chills, diaphoresis, fatigue, fever and unexpected weight change. Respiratory:  Negative for cough, choking, chest tightness, shortness of breath, wheezing and stridor. Cardiovascular:  Negative for chest pain, palpitations and leg swelling. Gastrointestinal:  Negative for abdominal pain. Genitourinary:  Negative for difficulty urinating. Musculoskeletal:  Negative for arthralgias and back pain. Skin:  Negative for rash. Neurological:  Negative for dizziness. Objective:   Physical Exam  Vitals and nursing note reviewed. Constitutional:       Appearance: She is well-developed. HENT:      Head: Normocephalic and atraumatic. Right Ear: External ear normal.      Left Ear: External ear normal.      Nose: Nose normal.   Eyes:      Conjunctiva/sclera: Conjunctivae normal.      Pupils: Pupils are equal, round, and reactive to light. Neck:      Thyroid: No thyromegaly. Vascular: No JVD. Trachea: No tracheal deviation. Cardiovascular:      Rate and Rhythm: Normal rate and regular rhythm. Heart sounds: Normal heart sounds. No murmur heard. No friction rub. No gallop. Pulmonary:      Effort: Pulmonary effort is normal. No respiratory distress. Breath sounds: Normal breath sounds. No stridor. No wheezing or rales. Chest:      Chest wall: No tenderness. Abdominal:      General: Bowel sounds are normal. There is no distension. Palpations: Abdomen is soft. There is no mass. Tenderness: There is no abdominal tenderness. There is no guarding or rebound. Musculoskeletal:         General: No tenderness. Normal range of motion.       Cervical back:

## 2023-05-22 RX ORDER — METOPROLOL TARTRATE 100 MG/1
TABLET ORAL
Qty: 30 TABLET | Refills: 1 | Status: SHIPPED | OUTPATIENT
Start: 2023-05-22

## 2023-05-22 NOTE — TELEPHONE ENCOUNTER
Last ov 03/28/2023   Future Appointments   Date Time Provider Alice Randolph   10/3/2023  1:00  Raymond Brewer, DO DYLON DUQUE

## 2023-06-19 RX ORDER — AMLODIPINE BESYLATE 5 MG/1
TABLET ORAL
Qty: 90 TABLET | Refills: 1 | Status: SHIPPED | OUTPATIENT
Start: 2023-06-19

## 2023-06-29 ENCOUNTER — APPOINTMENT (OUTPATIENT)
Dept: GENERAL RADIOLOGY | Age: 88
End: 2023-06-29
Payer: MEDICARE

## 2023-06-29 ENCOUNTER — HOSPITAL ENCOUNTER (EMERGENCY)
Age: 88
Discharge: HOME | End: 2023-06-29
Payer: MEDICARE

## 2023-06-29 VITALS
BODY MASS INDEX: 24.67 KG/M2 | TEMPERATURE: 98.4 F | RESPIRATION RATE: 20 BRPM | DIASTOLIC BLOOD PRESSURE: 71 MMHG | WEIGHT: 134 LBS | OXYGEN SATURATION: 99 % | HEART RATE: 77 BPM | SYSTOLIC BLOOD PRESSURE: 145 MMHG

## 2023-06-29 DIAGNOSIS — J45.901 EXACERBATION OF ASTHMA, UNSPECIFIED ASTHMA SEVERITY, UNSPECIFIED WHETHER PERSISTENT: ICD-10-CM

## 2023-06-29 DIAGNOSIS — J18.9 PNEUMONIA OF LEFT LOWER LOBE DUE TO INFECTIOUS ORGANISM: Primary | ICD-10-CM

## 2023-06-29 LAB
ALBUMIN SERPL-MCNC: 4.2 G/DL (ref 3.4–5)
ALBUMIN/GLOB SERPL: 1.3 {RATIO} (ref 1.1–2.2)
ALP SERPL-CCNC: 77 U/L (ref 40–129)
ALT SERPL-CCNC: 13 U/L (ref 10–40)
ANION GAP SERPL CALCULATED.3IONS-SCNC: 10 MMOL/L (ref 3–16)
AST SERPL-CCNC: 21 U/L (ref 15–37)
BASOPHILS # BLD: 0 K/UL (ref 0–0.2)
BASOPHILS NFR BLD: 0.7 %
BILIRUB SERPL-MCNC: 1 MG/DL (ref 0–1)
BUN SERPL-MCNC: 21 MG/DL (ref 7–20)
CALCIUM SERPL-MCNC: 9.8 MG/DL (ref 8.3–10.6)
CHLORIDE SERPL-SCNC: 102 MMOL/L (ref 99–110)
CO2 SERPL-SCNC: 26 MMOL/L (ref 21–32)
CREAT SERPL-MCNC: 1 MG/DL (ref 0.6–1.2)
DEPRECATED RDW RBC AUTO: 16.2 % (ref 12.4–15.4)
EKG ATRIAL RATE: 64 BPM
EKG DIAGNOSIS: NORMAL
EKG P AXIS: 27 DEGREES
EKG P-R INTERVAL: 132 MS
EKG Q-T INTERVAL: 448 MS
EKG QRS DURATION: 138 MS
EKG QTC CALCULATION (BAZETT): 462 MS
EKG R AXIS: 18 DEGREES
EKG T AXIS: -17 DEGREES
EKG VENTRICULAR RATE: 64 BPM
EOSINOPHIL # BLD: 0.1 K/UL (ref 0–0.6)
EOSINOPHIL NFR BLD: 1.4 %
GFR SERPLBLD CREATININE-BSD FMLA CKD-EPI: 54 ML/MIN/{1.73_M2}
GLUCOSE SERPL-MCNC: 88 MG/DL (ref 70–99)
HCT VFR BLD AUTO: 26.4 % (ref 36–48)
HGB BLD-MCNC: 9 G/DL (ref 12–16)
LACTATE BLDV-SCNC: 0.4 MMOL/L (ref 0.4–2)
LYMPHOCYTES # BLD: 1 K/UL (ref 1–5.1)
LYMPHOCYTES NFR BLD: 27 %
MCH RBC QN AUTO: 35.8 PG (ref 26–34)
MCHC RBC AUTO-ENTMCNC: 34.1 G/DL (ref 31–36)
MCV RBC AUTO: 104.9 FL (ref 80–100)
MONOCYTES # BLD: 0.3 K/UL (ref 0–1.3)
MONOCYTES NFR BLD: 8.4 %
NEUTROPHILS # BLD: 2.4 K/UL (ref 1.7–7.7)
NEUTROPHILS NFR BLD: 62.5 %
NT-PROBNP SERPL-MCNC: 832 PG/ML (ref 0–449)
PLATELET # BLD AUTO: 175 K/UL (ref 135–450)
PMV BLD AUTO: 8.5 FL (ref 5–10.5)
POTASSIUM SERPL-SCNC: 4.1 MMOL/L (ref 3.5–5.1)
PROT SERPL-MCNC: 7.5 G/DL (ref 6.4–8.2)
RBC # BLD AUTO: 2.52 M/UL (ref 4–5.2)
SODIUM SERPL-SCNC: 138 MMOL/L (ref 136–145)
TROPONIN, HIGH SENSITIVITY: 14 NG/L (ref 0–14)
TROPONIN, HIGH SENSITIVITY: 16 NG/L (ref 0–14)
WBC # BLD AUTO: 3.8 K/UL (ref 4–11)

## 2023-06-29 PROCEDURE — 93010 ELECTROCARDIOGRAM REPORT: CPT | Performed by: INTERNAL MEDICINE

## 2023-06-29 PROCEDURE — 6370000000 HC RX 637 (ALT 250 FOR IP): Performed by: PHYSICIAN ASSISTANT

## 2023-06-29 PROCEDURE — 87040 BLOOD CULTURE FOR BACTERIA: CPT

## 2023-06-29 PROCEDURE — 2580000003 HC RX 258: Performed by: PHYSICIAN ASSISTANT

## 2023-06-29 PROCEDURE — 93005 ELECTROCARDIOGRAM TRACING: CPT | Performed by: EMERGENCY MEDICINE

## 2023-06-29 PROCEDURE — 83605 ASSAY OF LACTIC ACID: CPT

## 2023-06-29 PROCEDURE — 99285 EMERGENCY DEPT VISIT HI MDM: CPT

## 2023-06-29 PROCEDURE — 84484 ASSAY OF TROPONIN QUANT: CPT

## 2023-06-29 PROCEDURE — 94640 AIRWAY INHALATION TREATMENT: CPT

## 2023-06-29 PROCEDURE — 71045 X-RAY EXAM CHEST 1 VIEW: CPT

## 2023-06-29 PROCEDURE — 85025 COMPLETE CBC W/AUTO DIFF WBC: CPT

## 2023-06-29 PROCEDURE — 83880 ASSAY OF NATRIURETIC PEPTIDE: CPT

## 2023-06-29 PROCEDURE — 80053 COMPREHEN METABOLIC PANEL: CPT

## 2023-06-29 RX ORDER — 0.9 % SODIUM CHLORIDE 0.9 %
500 INTRAVENOUS SOLUTION INTRAVENOUS ONCE
Status: COMPLETED | OUTPATIENT
Start: 2023-06-29 | End: 2023-06-29

## 2023-06-29 RX ORDER — LEVOFLOXACIN 750 MG/1
750 TABLET ORAL DAILY
Qty: 4 TABLET | Refills: 0 | Status: SHIPPED | OUTPATIENT
Start: 2023-06-30 | End: 2023-07-04

## 2023-06-29 RX ORDER — IPRATROPIUM BROMIDE AND ALBUTEROL SULFATE 2.5; .5 MG/3ML; MG/3ML
1 SOLUTION RESPIRATORY (INHALATION) ONCE
Status: COMPLETED | OUTPATIENT
Start: 2023-06-29 | End: 2023-06-29

## 2023-06-29 RX ORDER — PREDNISONE 50 MG/1
50 TABLET ORAL DAILY
Qty: 4 TABLET | Refills: 0 | Status: SHIPPED | OUTPATIENT
Start: 2023-06-30 | End: 2023-07-04

## 2023-06-29 RX ORDER — LEVOFLOXACIN 500 MG/1
750 TABLET, FILM COATED ORAL ONCE
Status: COMPLETED | OUTPATIENT
Start: 2023-06-29 | End: 2023-06-29

## 2023-06-29 RX ADMIN — IPRATROPIUM BROMIDE AND ALBUTEROL SULFATE 1 DOSE: .5; 3 SOLUTION RESPIRATORY (INHALATION) at 13:31

## 2023-06-29 RX ADMIN — PREDNISONE 50 MG: 20 TABLET ORAL at 15:13

## 2023-06-29 RX ADMIN — SODIUM CHLORIDE 500 ML: 9 INJECTION, SOLUTION INTRAVENOUS at 14:22

## 2023-06-29 RX ADMIN — LEVOFLOXACIN 750 MG: 500 TABLET, FILM COATED ORAL at 15:11

## 2023-06-29 ASSESSMENT — PAIN - FUNCTIONAL ASSESSMENT: PAIN_FUNCTIONAL_ASSESSMENT: 0-10

## 2023-06-29 ASSESSMENT — PAIN SCALES - GENERAL: PAINLEVEL_OUTOF10: 0

## 2023-06-30 LAB
BACTERIA BLD CULT ORG #2: NORMAL
BACTERIA BLD CULT: NORMAL

## 2023-07-03 LAB
BACTERIA BLD CULT ORG #2: NORMAL
BACTERIA BLD CULT: NORMAL

## 2023-07-18 RX ORDER — METOPROLOL TARTRATE 100 MG/1
TABLET ORAL
Qty: 30 TABLET | Refills: 1 | Status: SHIPPED | OUTPATIENT
Start: 2023-07-18

## 2023-07-18 NOTE — TELEPHONE ENCOUNTER
Last ov 03/28/2023   Future Appointments   Date Time Provider [address]   10/3/2023  1:00 PM [address] DO DYLON Gupta Yes

## 2023-09-05 RX ORDER — ALENDRONATE SODIUM 70 MG/1
TABLET ORAL
Qty: 4 TABLET | Refills: 2 | Status: SHIPPED | OUTPATIENT
Start: 2023-09-05

## 2023-09-18 RX ORDER — METOPROLOL TARTRATE 100 MG/1
TABLET ORAL
Qty: 30 TABLET | Refills: 1 | Status: SHIPPED | OUTPATIENT
Start: 2023-09-18

## 2023-09-18 NOTE — TELEPHONE ENCOUNTER
3/28/2023    Future Appointments   Date Time Provider [address]   10/3/2023  4:00 PM Madison Koyanagi, DO [place]

## 2023-10-03 ENCOUNTER — OFFICE VISIT (OUTPATIENT)
Dept: FAMILY MEDICINE CLINIC | Age: 88
End: 2023-10-03
Payer: MEDICARE

## 2023-10-03 VITALS
WEIGHT: 132.2 LBS | SYSTOLIC BLOOD PRESSURE: 122 MMHG | BODY MASS INDEX: 24.34 KG/M2 | RESPIRATION RATE: 16 BRPM | TEMPERATURE: 97.3 F | HEART RATE: 62 BPM | DIASTOLIC BLOOD PRESSURE: 64 MMHG | OXYGEN SATURATION: 97 %

## 2023-10-03 DIAGNOSIS — I10 ESSENTIAL HYPERTENSION: Primary | ICD-10-CM

## 2023-10-03 DIAGNOSIS — J45.20 MILD INTERMITTENT REACTIVE AIRWAY DISEASE WITHOUT COMPLICATION: ICD-10-CM

## 2023-10-03 DIAGNOSIS — Z23 INFLUENZA VACCINE NEEDED: ICD-10-CM

## 2023-10-03 PROCEDURE — 90694 VACC AIIV4 NO PRSRV 0.5ML IM: CPT | Performed by: SURGERY

## 2023-10-03 PROCEDURE — 99213 OFFICE O/P EST LOW 20 MIN: CPT | Performed by: SURGERY

## 2023-10-03 PROCEDURE — 1123F ACP DISCUSS/DSCN MKR DOCD: CPT | Performed by: SURGERY

## 2023-10-03 PROCEDURE — G0008 ADMIN INFLUENZA VIRUS VAC: HCPCS | Performed by: SURGERY

## 2023-10-03 RX ORDER — ALBUTEROL SULFATE 90 UG/1
AEROSOL, METERED RESPIRATORY (INHALATION)
Qty: 18 G | Refills: 2 | Status: SHIPPED | OUTPATIENT
Start: 2023-10-03

## 2023-10-03 ASSESSMENT — ENCOUNTER SYMPTOMS
NAUSEA: 0
ABDOMINAL PAIN: 0
SORE THROAT: 0
SHORTNESS OF BREATH: 0
DIARRHEA: 0
CONSTIPATION: 0
COUGH: 0

## 2023-10-03 NOTE — PROGRESS NOTES
10/3/2023    This is a 81 y.o. female   Chief Complaint   Patient presents with    New Patient   . Patient is here today to establish care. She has no complaints at this time. She does note needing a refill on her inhaler. She is an active 81-year-old female who still does household chores and cooking, gets around with out need of ambulatory aid, and overall is very healthy. She has not yet gotten a flu shot this year and is willing to get one today. Patient Active Problem List   Diagnosis    Anxiety    Left knee DJD    Heart murmur    Chronic anticoagulation    S/P knee replacement    Ecchymosis    Postmenopausal vaginal bleeding    DVT (deep venous thrombosis) ([facility])    Cervical radiculopathy    Osteoporosis    Hyperlipemia    Abnormal EKG    Pharyngitis    Bronchitis    Essential hypertension    Patient overweight    Anemia    Normocytic anemia    B12 deficiency    Leg swelling    Hypokalemia    Precordial chest pain    Wheezing    Status post cholecystectomy    Mild intermittent asthma    Iron deficiency anemia    Unstable angina pectoris ([facility])       Current Outpatient Medications   Medication Sig Dispense Refill    albuterol sulfate HFA (VENTOLIN HFA) 108 (90 Base) MCG/ACT inhaler INHALE TWO PUFFS BY MOUTH EVERY 6 HOURS AS NEEDED FOR WHEEZING 18 g 2    metoprolol (LOPRESSOR) 100 MG tablet TAKE 1 TABLET BY MOUTH DAILY 30 tablet 1    alendronate (FOSAMAX) 70 MG tablet TAKE 1 TABLET BY MOUTH ONCE WEEKLY ON AN EMPTY STOMACH BEFORE BREAKFAST.  REMAIN UPRIGHT FOR 30 MINUTES AND TAKE WITH 8 OUNCES OF WATER 4 tablet 2    amLODIPine (NORVASC) 5 MG tablet TAKE ONE TABLET BY MOUTH DAILY 90 tablet 1    IBUPROFEN PO Take by mouth      hydrocortisone (HYTONE) 2.5 % lotion Hydrocortisone Topical Lotion 2.5 %        active      Multiple Vitamin (MULTIVITAMIN PO) Take by mouth      ferrous sulfate (IRON 325) 325 (65 Fe) MG tablet Take 1 tablet by mouth 2 times daily (with meals)      Handicap Placard MISC by Does

## 2023-11-06 ENCOUNTER — HOSPITAL ENCOUNTER (OUTPATIENT)
Dept: MAMMOGRAPHY | Age: 88
Discharge: HOME | End: 2023-11-06
Payer: MEDICARE

## 2023-11-06 VITALS — HEIGHT: 62 IN | WEIGHT: 132 LBS | BODY MASS INDEX: 24.29 KG/M2

## 2023-11-06 DIAGNOSIS — Z12.31 BREAST CANCER SCREENING BY MAMMOGRAM: ICD-10-CM

## 2023-11-06 PROCEDURE — 77063 BREAST TOMOSYNTHESIS BI: CPT

## 2023-11-13 RX ORDER — METOPROLOL TARTRATE 100 MG/1
100 TABLET ORAL DAILY
Qty: 30 TABLET | Refills: 1 | Status: SHIPPED | OUTPATIENT
Start: 2023-11-13

## 2023-11-13 NOTE — TELEPHONE ENCOUNTER
Visit date not found          Future Appointments   Date Time Provider [address]   4/25/2024  4:00 PM Malcom [place], [facility] [address] [place]

## 2023-11-15 RX ORDER — METOPROLOL TARTRATE 100 MG/1
100 TABLET ORAL DAILY
Qty: 30 TABLET | Refills: 1 | OUTPATIENT
Start: 2023-11-15

## 2023-11-15 NOTE — TELEPHONE ENCOUNTER
LOV 10/3/2023    Future Appointments   Date Time Provider Department Center   4/25/2024  4:00 PM Fiordaliza العراقي DO [facility] [place] - [facility]

## 2023-11-27 RX ORDER — ALENDRONATE SODIUM 70 MG/1
TABLET ORAL
Qty: 4 TABLET | Refills: 2 | Status: SHIPPED | OUTPATIENT
Start: 2023-11-27

## 2023-12-15 RX ORDER — AMLODIPINE BESYLATE 5 MG/1
5 TABLET ORAL DAILY
Qty: 90 TABLET | Refills: 1 | Status: SHIPPED | OUTPATIENT
Start: 2023-12-15

## 2023-12-15 NOTE — TELEPHONE ENCOUNTER
Last ov 10/03/2023   Future Appointments   Date Time Provider [address]   4/25/2024  4:00 PM James العراقي DO [facility] [address] [place]

## 2024-01-02 NOTE — TELEPHONE ENCOUNTER
Last ov 10/3/23   Future Appointments   Date Time Provider Department Center   4/25/2024  4:00 PM Fiordaliza العراقي DO [facility] [place] [facility]

## 2024-01-04 RX ORDER — ALENDRONATE SODIUM 70 MG/1
TABLET ORAL
Qty: 4 TABLET | Refills: 2 | Status: SHIPPED | OUTPATIENT
Start: 2024-01-04

## 2024-01-09 RX ORDER — METOPROLOL TARTRATE 100 MG/1
100 TABLET ORAL DAILY
Qty: 90 TABLET | Refills: 1 | Status: SHIPPED | OUTPATIENT
Start: 2024-01-09

## 2024-01-09 NOTE — TELEPHONE ENCOUNTER
10/3/2023      Future Appointments   Date Time Provider Department Center   4/25/2024  4:00 PM Fiordaliza العراقي DO [facility] [place] [facility]

## 2024-01-11 RX ORDER — METOPROLOL TARTRATE 100 MG/1
100 TABLET ORAL DAILY
Qty: 30 TABLET | Refills: 2 | OUTPATIENT
Start: 2024-01-11

## 2024-01-30 NOTE — TELEPHONE ENCOUNTER
Last ov 10/03/2023   Future Appointments   Date Time Provider Department Center   4/25/2024  4:00 PM Fiordaliza العراقي DO [facility] [place] [facility]     Pharmacy is requesting a 90 day supply

## 2024-01-31 RX ORDER — ALENDRONATE SODIUM 70 MG/1
TABLET ORAL
Qty: 4 TABLET | Refills: 2 | Status: SHIPPED | OUTPATIENT
Start: 2024-01-31

## 2024-02-14 ENCOUNTER — TELEPHONE (OUTPATIENT)
Dept: FAMILY MEDICINE CLINIC | Age: 89
End: 2024-02-14

## 2024-02-14 NOTE — TELEPHONE ENCOUNTER
----- Message from Grace Novoa sent at 2/14/2024 12:23 PM EST -----  Subject: Hospital Follow Up    QUESTIONS  What hospital was the Patient Discharged from? [facility]  Date of Discharge? 2024-02-21  Discharge Location? Home  Reason for hospitalization as patient stated? due to a fall at home  What question does the patient have, if applicable?   ---------------------------------------------------------------------------  --------------  CALL BACK INFO  What is the best way for the office to contact you? OK to leave message on   voicemail  Preferred Call Back Phone Number? [phone]  ---------------------------------------------------------------------------  --------------  SCRIPT ANSWERS  Relationship to Patient? Other/Third Party  Representative Name? cynthia  Additional information verified (besides Name and Date of Birth)? Phone   Number

## 2024-02-16 ENCOUNTER — OFFICE VISIT (OUTPATIENT)
Dept: FAMILY MEDICINE CLINIC | Age: 89
End: 2024-02-16
Payer: MEDICARE

## 2024-02-16 VITALS
DIASTOLIC BLOOD PRESSURE: 74 MMHG | HEART RATE: 70 BPM | SYSTOLIC BLOOD PRESSURE: 139 MMHG | HEIGHT: 62 IN | OXYGEN SATURATION: 98 % | WEIGHT: 142.8 LBS | BODY MASS INDEX: 26.28 KG/M2

## 2024-02-16 DIAGNOSIS — I49.9 IRREGULAR HEART RHYTHM: Primary | ICD-10-CM

## 2024-02-16 PROCEDURE — 99213 OFFICE O/P EST LOW 20 MIN: CPT | Performed by: SURGERY

## 2024-02-16 PROCEDURE — 1123F ACP DISCUSS/DSCN MKR DOCD: CPT | Performed by: SURGERY

## 2024-02-16 RX ORDER — METOPROLOL SUCCINATE 100 MG/1
100 TABLET, EXTENDED RELEASE ORAL DAILY
COMMUNITY
Start: 2024-02-02

## 2024-02-16 NOTE — PROGRESS NOTES
2/16/2024    This is a 88 y.o. female   Chief Complaint   Patient presents with    Irregular Heart Beat     Possible a fib with passing out     .    This is actually a hospital follow-up as patient was seen at [facility] for pre-syncope/syncope.  While there they noticed a short run of A-fib.  Cardiology was consulted and she laced on a 30-day cardiac monitor upon discharge. She is scheduled to follow-up with cardiology after completion of her cardiac monitor.  Additionally her amlodipine was decreased to 2.5 mg from 5 mg due to some hypotension.  And her metoprolol was changed to XL for rate control.    She notes she is feeling good since discharge.  They have had a lot going on and were not able to schedule follow-up sooner than today.  She had a small episode of lightheadedness a day or so ago that resolved after just a couple seconds.  But they did not realize that the amlodipine had been changed from 1 tab daily to half a tab daily and we will make that change starting today.         Patient Active Problem List   Diagnosis    Anxiety    Left knee DJD    Heart murmur    Chronic anticoagulation    S/P knee replacement    Ecchymosis    Postmenopausal vaginal bleeding    DVT (deep venous thrombosis) ([facility])    Cervical radiculopathy    Osteoporosis    Hyperlipemia    Abnormal EKG    Pharyngitis    Bronchitis    Essential hypertension    Patient overweight    Anemia    Normocytic anemia    B12 deficiency    Leg swelling    Hypokalemia    Precordial chest pain    Wheezing    Status post cholecystectomy    Mild intermittent asthma    Iron deficiency anemia    Unstable angina pectoris ([facility])       Current Outpatient Medications   Medication Sig Dispense Refill    metoprolol succinate (TOPROL XL) 100 MG extended release tablet Take 1 tablet by mouth daily      alendronate (FOSAMAX) 70 MG tablet TAKE 1 TABLET BY MOUTH ONCE WEEKLY ON AN EMPTY STOMACH BEFORE BREAKFAST. REMAIN UPRIGHT FOR 30 MINUTES AND TAKE WITH 8 OUNCES

## 2024-04-02 ENCOUNTER — TELEPHONE (OUTPATIENT)
Dept: FAMILY MEDICINE CLINIC | Age: 89
End: 2024-04-02

## 2024-04-02 NOTE — TELEPHONE ENCOUNTER
Patient called, needs refill of metoprolol succinate (TOPROL XL) 100 MG extended release tablet       Please use [facility] [id number] - [place], [place] - [address] - [place] [phone] - F [phone] [[id number]]     2/16/2024    Future Appointments   Date Time Provider Department Center   4/25/2024  4:00 PM Fiordaliza Gurrola DO [facility] [place] [facility]

## 2024-04-04 RX ORDER — METOPROLOL SUCCINATE 100 MG/1
100 TABLET, EXTENDED RELEASE ORAL DAILY
Qty: 30 TABLET | Refills: 1 | Status: SHIPPED | OUTPATIENT
Start: 2024-04-04

## 2024-04-25 ENCOUNTER — OFFICE VISIT (OUTPATIENT)
Dept: FAMILY MEDICINE CLINIC | Age: 89
End: 2024-04-25
Payer: MEDICARE

## 2024-04-25 VITALS — BODY MASS INDEX: 26 KG/M2 | HEIGHT: 60 IN | WEIGHT: 132.4 LBS

## 2024-04-25 DIAGNOSIS — R53.83 OTHER FATIGUE: ICD-10-CM

## 2024-04-25 DIAGNOSIS — M81.0 OSTEOPOROSIS WITHOUT CURRENT PATHOLOGICAL FRACTURE, UNSPECIFIED OSTEOPOROSIS TYPE: ICD-10-CM

## 2024-04-25 DIAGNOSIS — I82.461 DEEP VEIN THROMBOSIS (DVT) OF CALF MUSCLE VEIN OF RIGHT LOWER EXTREMITY, UNSPECIFIED CHRONICITY (HCC): ICD-10-CM

## 2024-04-25 DIAGNOSIS — I10 ESSENTIAL HYPERTENSION: ICD-10-CM

## 2024-04-25 DIAGNOSIS — D75.89 MACROCYTOSIS: ICD-10-CM

## 2024-04-25 DIAGNOSIS — N18.30 STAGE 3 CHRONIC KIDNEY DISEASE, UNSPECIFIED WHETHER STAGE 3A OR 3B CKD (HCC): ICD-10-CM

## 2024-04-25 DIAGNOSIS — Z00.00 MEDICARE ANNUAL WELLNESS VISIT, SUBSEQUENT: Primary | ICD-10-CM

## 2024-04-25 PROCEDURE — 1123F ACP DISCUSS/DSCN MKR DOCD: CPT | Performed by: SURGERY

## 2024-04-25 PROCEDURE — G0439 PPPS, SUBSEQ VISIT: HCPCS | Performed by: SURGERY

## 2024-04-25 SDOH — ECONOMIC STABILITY: FOOD INSECURITY: WITHIN THE PAST 12 MONTHS, YOU WORRIED THAT YOUR FOOD WOULD RUN OUT BEFORE YOU GOT MONEY TO BUY MORE.: NEVER TRUE

## 2024-04-25 SDOH — ECONOMIC STABILITY: FOOD INSECURITY: WITHIN THE PAST 12 MONTHS, THE FOOD YOU BOUGHT JUST DIDN'T LAST AND YOU DIDN'T HAVE MONEY TO GET MORE.: NEVER TRUE

## 2024-04-25 SDOH — ECONOMIC STABILITY: INCOME INSECURITY: HOW HARD IS IT FOR YOU TO PAY FOR THE VERY BASICS LIKE FOOD, HOUSING, MEDICAL CARE, AND HEATING?: NOT HARD AT ALL

## 2024-04-25 ASSESSMENT — PATIENT HEALTH QUESTIONNAIRE - PHQ9
1. LITTLE INTEREST OR PLEASURE IN DOING THINGS: NOT AT ALL
SUM OF ALL RESPONSES TO PHQ QUESTIONS 1-9: 0
SUM OF ALL RESPONSES TO PHQ QUESTIONS 1-9: 0
2. FEELING DOWN, DEPRESSED OR HOPELESS: NOT AT ALL
SUM OF ALL RESPONSES TO PHQ QUESTIONS 1-9: 0
SUM OF ALL RESPONSES TO PHQ9 QUESTIONS 1 & 2: 0
SUM OF ALL RESPONSES TO PHQ QUESTIONS 1-9: 0

## 2024-04-25 NOTE — PROGRESS NOTES
Medicare Annual Wellness Visit    Kelly Oreilly is here for Medicare AWV    Assessment & Plan   Medicare annual wellness visit, subsequent  Recommendations for Preventive Services Due: see orders and patient instructions/AVS.  Recommended screening schedule for the next 5-10 years is provided to the patient in written form: see Patient Instructions/AVS.     No follow-ups on file.     Subjective       Patient's complete Health Risk Assessment and screening values have been reviewed and are found in Flowsheets. The following problems were reviewed today and where indicated follow up appointments were made and/or referrals ordered.    Positive Risk Factor Screenings with Interventions:    Fall Risk:  Do you feel unsteady or are you worried about falling? : (!) yes  2 or more falls in past year?: no  Fall with injury in past year?: (!) yes     Interventions:    Reviewed medications, home hazards, visual acuity, and co-morbidities that can increase risk for falls  Patient declines any further evaluation or treatment    Cognitive:   Clock Drawing Test (CDT): (!) Abnormal  Words recalled: 1 Word Recalled  Total Score: (!) 1  Total Score Interpretation: Abnormal Mini-Cog  Interventions:  Patient declines any further evaluation or treatment           General HRA Questions:  Select all that apply: (!) New or Increased Pain    Pain Interventions:  Declines need for medication       Dentist Screen:  Have you seen the dentist within the past year?: (!) No    Intervention:  Advised to schedule with their dentist     Vision Screen:  Do you have difficulty driving, watching TV, or doing any of your daily activities because of your eyesight?: No  Have you had an eye exam within the past year?: (!) No  No results found.    Interventions:   Patient encouraged to make appointment with their eye specialist                    Objective   Vitals:    04/25/24 1553   Weight: 60.1 kg (132 lb 6.4 oz)   Height: 1.524 m (5')      Body mass index

## 2024-04-26 DIAGNOSIS — R53.83 OTHER FATIGUE: ICD-10-CM

## 2024-04-26 DIAGNOSIS — I10 ESSENTIAL HYPERTENSION: ICD-10-CM

## 2024-04-26 DIAGNOSIS — M81.0 OSTEOPOROSIS WITHOUT CURRENT PATHOLOGICAL FRACTURE, UNSPECIFIED OSTEOPOROSIS TYPE: ICD-10-CM

## 2024-04-26 DIAGNOSIS — D75.89 MACROCYTOSIS: ICD-10-CM

## 2024-04-27 LAB
25(OH)D3 SERPL-MCNC: 29.4 NG/ML
ALBUMIN SERPL-MCNC: 4.5 G/DL (ref 3.4–5)
ALBUMIN/GLOB SERPL: 1.7 {RATIO} (ref 1.1–2.2)
ALP SERPL-CCNC: 75 U/L (ref 40–129)
ALT SERPL-CCNC: 10 U/L (ref 10–40)
ANION GAP SERPL CALCULATED.3IONS-SCNC: 13 MMOL/L (ref 3–16)
AST SERPL-CCNC: 19 U/L (ref 15–37)
BASOPHILS # BLD: 0 K/UL (ref 0–0.2)
BASOPHILS NFR BLD: 0.8 %
BILIRUB SERPL-MCNC: 0.6 MG/DL (ref 0–1)
BUN SERPL-MCNC: 21 MG/DL (ref 7–20)
CALCIUM SERPL-MCNC: 10.1 MG/DL (ref 8.3–10.6)
CHLORIDE SERPL-SCNC: 104 MMOL/L (ref 99–110)
CO2 SERPL-SCNC: 27 MMOL/L (ref 21–32)
CREAT SERPL-MCNC: 1 MG/DL (ref 0.6–1.2)
DEPRECATED RDW RBC AUTO: 15 % (ref 12.4–15.4)
EOSINOPHIL # BLD: 0.1 K/UL (ref 0–0.6)
EOSINOPHIL NFR BLD: 1.3 %
FOLATE SERPL-MCNC: 13.21 NG/ML (ref 4.78–24.2)
GFR SERPLBLD CREATININE-BSD FMLA CKD-EPI: 54 ML/MIN/{1.73_M2}
GLUCOSE SERPL-MCNC: 92 MG/DL (ref 70–99)
HCT VFR BLD AUTO: 28.4 % (ref 36–48)
HGB BLD-MCNC: 9.7 G/DL (ref 12–16)
LYMPHOCYTES # BLD: 1.9 K/UL (ref 1–5.1)
LYMPHOCYTES NFR BLD: 46 %
MCH RBC QN AUTO: 35.5 PG (ref 26–34)
MCHC RBC AUTO-ENTMCNC: 34.3 G/DL (ref 31–36)
MCV RBC AUTO: 103.6 FL (ref 80–100)
MONOCYTES # BLD: 0.3 K/UL (ref 0–1.3)
MONOCYTES NFR BLD: 7.9 %
NEUTROPHILS # BLD: 1.8 K/UL (ref 1.7–7.7)
NEUTROPHILS NFR BLD: 44 %
PLATELET # BLD AUTO: 211 K/UL (ref 135–450)
PMV BLD AUTO: 9.2 FL (ref 5–10.5)
POTASSIUM SERPL-SCNC: 4.6 MMOL/L (ref 3.5–5.1)
PROT SERPL-MCNC: 7.2 G/DL (ref 6.4–8.2)
RBC # BLD AUTO: 2.74 M/UL (ref 4–5.2)
SODIUM SERPL-SCNC: 144 MMOL/L (ref 136–145)
TSH SERPL DL<=0.005 MIU/L-ACNC: 0.77 UIU/ML (ref 0.27–4.2)
VIT B12 SERPL-MCNC: 655 PG/ML (ref 211–911)
WBC # BLD AUTO: 4.2 K/UL (ref 4–11)

## 2024-05-30 RX ORDER — METOPROLOL SUCCINATE 100 MG/1
100 TABLET, EXTENDED RELEASE ORAL DAILY
Qty: 30 TABLET | Refills: 1 | Status: SHIPPED | OUTPATIENT
Start: 2024-05-30

## 2024-06-14 RX ORDER — AMLODIPINE BESYLATE 5 MG/1
5 TABLET ORAL DAILY
Qty: 90 TABLET | Refills: 1 | Status: SHIPPED | OUTPATIENT
Start: 2024-06-14

## 2024-07-29 RX ORDER — METOPROLOL SUCCINATE 100 MG/1
100 TABLET, EXTENDED RELEASE ORAL DAILY
Qty: 30 TABLET | Refills: 1 | Status: SHIPPED | OUTPATIENT
Start: 2024-07-29

## 2024-08-01 RX ORDER — ALENDRONATE SODIUM 70 MG/1
TABLET ORAL
Qty: 4 TABLET | Refills: 2 | Status: SHIPPED | OUTPATIENT
Start: 2024-08-01

## 2024-09-12 ENCOUNTER — OFFICE VISIT (OUTPATIENT)
Dept: FAMILY MEDICINE CLINIC | Age: 89
End: 2024-09-12

## 2024-09-12 VITALS
HEIGHT: 60 IN | WEIGHT: 131 LBS | OXYGEN SATURATION: 97 % | BODY MASS INDEX: 25.72 KG/M2 | DIASTOLIC BLOOD PRESSURE: 77 MMHG | SYSTOLIC BLOOD PRESSURE: 139 MMHG | HEART RATE: 77 BPM

## 2024-09-12 DIAGNOSIS — J45.20 MILD INTERMITTENT REACTIVE AIRWAY DISEASE WITHOUT COMPLICATION: ICD-10-CM

## 2024-09-12 DIAGNOSIS — R53.83 OTHER FATIGUE: ICD-10-CM

## 2024-09-12 DIAGNOSIS — N39.0 URINARY TRACT INFECTION WITHOUT HEMATURIA, SITE UNSPECIFIED: Primary | ICD-10-CM

## 2024-09-12 LAB
BASOPHILS # BLD: 0 K/UL (ref 0–0.2)
BASOPHILS NFR BLD: 0.4 %
BILIRUBIN, POC: NORMAL
BLOOD URINE, POC: NORMAL
CLARITY, POC: NORMAL
COLOR, POC: NORMAL
DEPRECATED RDW RBC AUTO: 15.7 % (ref 12.4–15.4)
EOSINOPHIL # BLD: 0.1 K/UL (ref 0–0.6)
EOSINOPHIL NFR BLD: 1.7 %
GLUCOSE URINE, POC: NORMAL MG/DL
HCT VFR BLD AUTO: 21.2 % (ref 36–48)
HGB BLD-MCNC: 7.1 G/DL (ref 12–16)
KETONES, POC: NORMAL MG/DL
LEUKOCYTE EST, POC: NORMAL
LYMPHOCYTES # BLD: 1.8 K/UL (ref 1–5.1)
LYMPHOCYTES NFR BLD: 30.4 %
MCH RBC QN AUTO: 34 PG (ref 26–34)
MCHC RBC AUTO-ENTMCNC: 33.4 G/DL (ref 31–36)
MCV RBC AUTO: 102 FL (ref 80–100)
MONOCYTES # BLD: 0.5 K/UL (ref 0–1.3)
MONOCYTES NFR BLD: 8.3 %
NEUTROPHILS # BLD: 3.5 K/UL (ref 1.7–7.7)
NEUTROPHILS NFR BLD: 59.2 %
NITRITE, POC: NORMAL
PH, POC: 6
PLATELET # BLD AUTO: 340 K/UL (ref 135–450)
PMV BLD AUTO: 8.8 FL (ref 5–10.5)
PROTEIN, POC: NORMAL MG/DL
RBC # BLD AUTO: 2.08 M/UL (ref 4–5.2)
SPECIFIC GRAVITY, POC: 1.01
UROBILINOGEN, POC: 0.2 MG/DL
WBC # BLD AUTO: 5.9 K/UL (ref 4–11)

## 2024-09-12 RX ORDER — ALBUTEROL SULFATE 90 UG/1
AEROSOL, METERED RESPIRATORY (INHALATION)
Qty: 18 G | Refills: 2 | Status: SHIPPED | OUTPATIENT
Start: 2024-09-12

## 2024-09-12 RX ORDER — NITROFURANTOIN 25; 75 MG/1; MG/1
100 CAPSULE ORAL 2 TIMES DAILY
Qty: 14 CAPSULE | Refills: 0 | Status: SHIPPED | OUTPATIENT
Start: 2024-09-12 | End: 2024-09-19

## 2024-09-12 ASSESSMENT — ENCOUNTER SYMPTOMS
NAUSEA: 0
COUGH: 0
DIARRHEA: 0
SHORTNESS OF BREATH: 1
CONSTIPATION: 0

## 2024-09-13 LAB
ALBUMIN SERPL-MCNC: 3.9 G/DL (ref 3.4–5)
ALBUMIN/GLOB SERPL: 1.1 {RATIO} (ref 1.1–2.2)
ALP SERPL-CCNC: 87 U/L (ref 40–129)
ALT SERPL-CCNC: 12 U/L (ref 10–40)
ANION GAP SERPL CALCULATED.3IONS-SCNC: 9 MMOL/L (ref 3–16)
AST SERPL-CCNC: 20 U/L (ref 15–37)
BACTERIA UR CULT: NORMAL
BILIRUB SERPL-MCNC: 0.6 MG/DL (ref 0–1)
BUN SERPL-MCNC: 26 MG/DL (ref 7–20)
CALCIUM SERPL-MCNC: 9.5 MG/DL (ref 8.3–10.6)
CHLORIDE SERPL-SCNC: 100 MMOL/L (ref 99–110)
CO2 SERPL-SCNC: 26 MMOL/L (ref 21–32)
CREAT SERPL-MCNC: 1.4 MG/DL (ref 0.6–1.2)
GFR SERPLBLD CREATININE-BSD FMLA CKD-EPI: 36 ML/MIN/{1.73_M2}
GLUCOSE SERPL-MCNC: 85 MG/DL (ref 70–99)
POTASSIUM SERPL-SCNC: 4.7 MMOL/L (ref 3.5–5.1)
PROT SERPL-MCNC: 7.6 G/DL (ref 6.4–8.2)
SODIUM SERPL-SCNC: 135 MMOL/L (ref 136–145)

## 2024-09-16 ENCOUNTER — TELEPHONE (OUTPATIENT)
Dept: FAMILY MEDICINE CLINIC | Age: 89
End: 2024-09-16

## 2024-09-18 ENCOUNTER — OFFICE VISIT (OUTPATIENT)
Dept: FAMILY MEDICINE CLINIC | Age: 89
End: 2024-09-18

## 2024-09-18 VITALS
TEMPERATURE: 98.2 F | BODY MASS INDEX: 25.64 KG/M2 | HEIGHT: 60 IN | RESPIRATION RATE: 16 BRPM | DIASTOLIC BLOOD PRESSURE: 72 MMHG | OXYGEN SATURATION: 100 % | SYSTOLIC BLOOD PRESSURE: 132 MMHG | HEART RATE: 90 BPM | WEIGHT: 130.6 LBS

## 2024-09-18 DIAGNOSIS — N30.00 ACUTE CYSTITIS WITHOUT HEMATURIA: ICD-10-CM

## 2024-09-18 DIAGNOSIS — D51.8 OTHER VITAMIN B12 DEFICIENCY ANEMIA: ICD-10-CM

## 2024-09-18 DIAGNOSIS — I10 ESSENTIAL HYPERTENSION: ICD-10-CM

## 2024-09-18 DIAGNOSIS — I25.10 CORONARY ARTERY DISEASE INVOLVING NATIVE CORONARY ARTERY OF NATIVE HEART WITHOUT ANGINA PECTORIS: ICD-10-CM

## 2024-09-18 DIAGNOSIS — Z09 HOSPITAL DISCHARGE FOLLOW-UP: ICD-10-CM

## 2024-09-18 DIAGNOSIS — D64.9 CHRONIC ANEMIA: Primary | ICD-10-CM

## 2024-09-18 DIAGNOSIS — M81.0 OSTEOPOROSIS WITHOUT CURRENT PATHOLOGICAL FRACTURE, UNSPECIFIED OSTEOPOROSIS TYPE: ICD-10-CM

## 2024-09-18 DIAGNOSIS — D46.9 MYELODYSPLASTIC SYNDROME (HCC): ICD-10-CM

## 2024-09-18 DIAGNOSIS — I47.10 PAROXYSMAL SVT (SUPRAVENTRICULAR TACHYCARDIA) (HCC): ICD-10-CM

## 2024-09-18 ASSESSMENT — PATIENT HEALTH QUESTIONNAIRE - PHQ9
2. FEELING DOWN, DEPRESSED OR HOPELESS: NOT AT ALL
SUM OF ALL RESPONSES TO PHQ QUESTIONS 1-9: 0
SUM OF ALL RESPONSES TO PHQ QUESTIONS 1-9: 0
SUM OF ALL RESPONSES TO PHQ9 QUESTIONS 1 & 2: 0
SUM OF ALL RESPONSES TO PHQ QUESTIONS 1-9: 0
1. LITTLE INTEREST OR PLEASURE IN DOING THINGS: NOT AT ALL
SUM OF ALL RESPONSES TO PHQ QUESTIONS 1-9: 0

## 2024-09-26 RX ORDER — METOPROLOL SUCCINATE 100 MG/1
100 TABLET, EXTENDED RELEASE ORAL DAILY
Qty: 90 TABLET | Refills: 1 | Status: SHIPPED | OUTPATIENT
Start: 2024-09-26

## 2024-10-28 ENCOUNTER — TRANSCRIBE ORDERS (OUTPATIENT)
Dept: ADMINISTRATIVE | Age: 89
End: 2024-10-28

## 2024-10-28 DIAGNOSIS — Z12.31 ENCOUNTER FOR MAMMOGRAM TO ESTABLISH BASELINE MAMMOGRAM: Primary | ICD-10-CM

## 2024-10-28 RX ORDER — ALENDRONATE SODIUM 70 MG/1
TABLET ORAL
Qty: 4 TABLET | Refills: 2 | Status: SHIPPED | OUTPATIENT
Start: 2024-10-28

## 2024-10-28 NOTE — TELEPHONE ENCOUNTER
LAST REFILL 08/1/2024  AMOUNT 4     2 REFILLS  LAST VISIT 9/18/2024  NEXT VISIT No future appointments.

## 2024-11-06 ENCOUNTER — HOSPITAL ENCOUNTER (OUTPATIENT)
Dept: MAMMOGRAPHY | Age: 89
Discharge: HOME | End: 2024-11-06
Attending: SURGERY
Payer: MEDICARE

## 2024-11-06 VITALS — HEIGHT: 61 IN | BODY MASS INDEX: 24.17 KG/M2 | WEIGHT: 128 LBS

## 2024-11-06 DIAGNOSIS — Z12.31 ENCOUNTER FOR SCREENING MAMMOGRAM FOR BREAST CANCER: ICD-10-CM

## 2024-11-06 DIAGNOSIS — Z12.31 ENCOUNTER FOR MAMMOGRAM TO ESTABLISH BASELINE MAMMOGRAM: ICD-10-CM

## 2024-11-06 PROCEDURE — 77063 BREAST TOMOSYNTHESIS BI: CPT

## 2024-12-12 RX ORDER — AMLODIPINE BESYLATE 5 MG/1
5 TABLET ORAL DAILY
Qty: 90 TABLET | Refills: 1 | Status: SHIPPED | OUTPATIENT
Start: 2024-12-12

## 2025-02-13 RX ORDER — ALENDRONATE SODIUM 70 MG/1
TABLET ORAL
Qty: 12 TABLET | Refills: 2 | Status: SHIPPED | OUTPATIENT
Start: 2025-02-13

## 2025-03-24 RX ORDER — METOPROLOL SUCCINATE 100 MG/1
100 TABLET, EXTENDED RELEASE ORAL DAILY
Qty: 90 TABLET | Refills: 1 | Status: SHIPPED | OUTPATIENT
Start: 2025-03-24

## 2025-06-20 RX ORDER — AMLODIPINE BESYLATE 5 MG/1
5 TABLET ORAL DAILY
Qty: 90 TABLET | Refills: 1 | Status: SHIPPED | OUTPATIENT
Start: 2025-06-20